# Patient Record
Sex: MALE | Race: BLACK OR AFRICAN AMERICAN | HISPANIC OR LATINO | Employment: FULL TIME | URBAN - METROPOLITAN AREA
[De-identification: names, ages, dates, MRNs, and addresses within clinical notes are randomized per-mention and may not be internally consistent; named-entity substitution may affect disease eponyms.]

---

## 2019-08-23 ENCOUNTER — HOSPITAL ENCOUNTER (EMERGENCY)
Facility: HOSPITAL | Age: 34
Discharge: HOME | End: 2019-08-23
Attending: EMERGENCY MEDICINE | Admitting: EMERGENCY MEDICINE
Payer: COMMERCIAL

## 2019-08-23 VITALS
SYSTOLIC BLOOD PRESSURE: 118 MMHG | RESPIRATION RATE: 18 BRPM | HEART RATE: 79 BPM | OXYGEN SATURATION: 100 % | DIASTOLIC BLOOD PRESSURE: 62 MMHG | WEIGHT: 168.65 LBS | TEMPERATURE: 97.5 F

## 2019-08-23 DIAGNOSIS — R55 SYNCOPE: Primary | ICD-10-CM

## 2019-08-23 LAB
ALBUMIN SERPL BCP-MCNC: 4.4 G/DL (ref 3–5.2)
ALP SERPL-CCNC: 67 U/L (ref 43–122)
ALT SERPL W P-5'-P-CCNC: 15 U/L (ref 9–52)
ANION GAP SERPL CALCULATED.3IONS-SCNC: 8 MMOL/L (ref 5–14)
AST SERPL W P-5'-P-CCNC: 23 U/L (ref 17–59)
ATRIAL RATE: 73 BPM
BASOPHILS # BLD AUTO: 0.1 THOUSANDS/ΜL (ref 0–0.1)
BASOPHILS NFR BLD AUTO: 1 % (ref 0–1)
BILIRUB SERPL-MCNC: 0.3 MG/DL
BUN SERPL-MCNC: 16 MG/DL (ref 5–25)
CALCIUM SERPL-MCNC: 9.3 MG/DL (ref 8.4–10.2)
CHLORIDE SERPL-SCNC: 103 MMOL/L (ref 97–108)
CO2 SERPL-SCNC: 29 MMOL/L (ref 22–30)
CREAT SERPL-MCNC: 0.94 MG/DL (ref 0.7–1.5)
EOSINOPHIL # BLD AUTO: 0.1 THOUSAND/ΜL (ref 0–0.4)
EOSINOPHIL NFR BLD AUTO: 2 % (ref 0–6)
ERYTHROCYTE [DISTWIDTH] IN BLOOD BY AUTOMATED COUNT: 15 %
GFR SERPL CREATININE-BSD FRML MDRD: 124 ML/MIN/1.73SQ M
GLUCOSE SERPL-MCNC: 78 MG/DL (ref 65–140)
GLUCOSE SERPL-MCNC: 87 MG/DL (ref 70–99)
HCT VFR BLD AUTO: 36.8 % (ref 41–53)
HGB BLD-MCNC: 12.2 G/DL (ref 13.5–17.5)
LYMPHOCYTES # BLD AUTO: 2.1 THOUSANDS/ΜL (ref 0.5–4)
LYMPHOCYTES NFR BLD AUTO: 33 % (ref 25–45)
MCH RBC QN AUTO: 27.5 PG (ref 26–34)
MCHC RBC AUTO-ENTMCNC: 33.1 G/DL (ref 31–36)
MCV RBC AUTO: 83 FL (ref 80–100)
MONOCYTES # BLD AUTO: 0.6 THOUSAND/ΜL (ref 0.2–0.9)
MONOCYTES NFR BLD AUTO: 10 % (ref 1–10)
NEUTROPHILS # BLD AUTO: 3.5 THOUSANDS/ΜL (ref 1.8–7.8)
NEUTS SEG NFR BLD AUTO: 54 % (ref 45–65)
P AXIS: 38 DEGREES
PLATELET # BLD AUTO: 461 THOUSANDS/UL (ref 150–450)
PMV BLD AUTO: 7.8 FL (ref 8.9–12.7)
POTASSIUM SERPL-SCNC: 4.8 MMOL/L (ref 3.6–5)
PR INTERVAL: 112 MS
PROT SERPL-MCNC: 8 G/DL (ref 5.9–8.4)
QRS AXIS: 69 DEGREES
QRSD INTERVAL: 80 MS
QT INTERVAL: 394 MS
QTC INTERVAL: 434 MS
RBC # BLD AUTO: 4.43 MILLION/UL (ref 4.5–5.9)
SODIUM SERPL-SCNC: 140 MMOL/L (ref 137–147)
T WAVE AXIS: 30 DEGREES
VENTRICULAR RATE: 73 BPM
WBC # BLD AUTO: 6.5 THOUSAND/UL (ref 4.5–11)

## 2019-08-23 PROCEDURE — 82948 REAGENT STRIP/BLOOD GLUCOSE: CPT

## 2019-08-23 PROCEDURE — 93005 ELECTROCARDIOGRAM TRACING: CPT

## 2019-08-23 PROCEDURE — 99284 EMERGENCY DEPT VISIT MOD MDM: CPT

## 2019-08-23 PROCEDURE — 93010 ELECTROCARDIOGRAM REPORT: CPT | Performed by: INTERNAL MEDICINE

## 2019-08-23 PROCEDURE — 80053 COMPREHEN METABOLIC PANEL: CPT | Performed by: EMERGENCY MEDICINE

## 2019-08-23 PROCEDURE — 36415 COLL VENOUS BLD VENIPUNCTURE: CPT | Performed by: EMERGENCY MEDICINE

## 2019-08-23 PROCEDURE — 99283 EMERGENCY DEPT VISIT LOW MDM: CPT | Performed by: EMERGENCY MEDICINE

## 2019-08-23 PROCEDURE — 96360 HYDRATION IV INFUSION INIT: CPT

## 2019-08-23 PROCEDURE — 85025 COMPLETE CBC W/AUTO DIFF WBC: CPT | Performed by: EMERGENCY MEDICINE

## 2019-08-23 RX ORDER — BUPRENORPHINE HYDROCHLORIDE AND NALOXONE HYDROCHLORIDE DIHYDRATE 8; 2 MG/1; MG/1
1 TABLET SUBLINGUAL 2 TIMES DAILY
COMMUNITY

## 2019-08-23 RX ADMIN — SODIUM CHLORIDE 1000 ML: 0.9 INJECTION, SOLUTION INTRAVENOUS at 14:19

## 2019-08-23 NOTE — ED NOTES
Pt states he was recently put on Suboxone and ran out of meds on Monday          Ana Rosa Mora RN  08/23/19 1136

## 2019-08-23 NOTE — ED PROVIDER NOTES
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 felt a little bit off and then remembers waking up on the floor with 2 coworkers around him  He states he has passed out in the past but has no recollection of what was going on at that time  States his medications these pose a take a related to schizophrenia, bipolar, hypertension  States he recently moved here from [place] this not been taking his medications for about 1 month  He states he does take Suboxone as well too to previous addiction problems  Denies any focal weakness numbness or tingling  Denies any current headache, vision changes, nausea vomiting diarrhea dysuria frequency  States he has not been eating much food recently but has been drinking plenty of fluids  Prior to Admission Medications   Prescriptions Last Dose Informant Patient Reported? Taking? buprenorphine-naloxone (SUBOXONE) 8-2 mg per SL tablet 8/19/2019  Yes Yes   Sig: Place 1 tablet under the tongue 2 (two) times a day      Facility-Administered Medications: None       Past Medical History:   Diagnosis Date    Hypertension        History reviewed  No pertinent surgical history  History reviewed  No pertinent family history  I have reviewed and agree with the history as documented  Social History     Tobacco Use    Smoking status: Current Every Day Smoker     Packs/day: 0 25    Smokeless tobacco: Never Used   Substance Use Topics    Alcohol use: Not Currently    Drug use: Not Currently        Review of Systems   Constitutional: Negative  Negative for chills and fever  HENT: Negative  Negative for rhinorrhea, sore throat, trouble swallowing and voice change  Eyes: Negative  Negative for pain and visual disturbance  Respiratory: Negative  Negative for cough, shortness of breath and wheezing  Cardiovascular: Negative  Negative for chest pain and palpitations  Gastrointestinal: Negative for abdominal pain, diarrhea, nausea and vomiting  Genitourinary: Negative  Negative for dysuria and frequency  Musculoskeletal: Negative  Negative for neck pain and neck stiffness  Skin: Negative  Negative for rash  Neurological: Positive for syncope  Negative for dizziness, speech difficulty, weakness, light-headedness, numbness and headaches  Physical Exam  Physical Exam   Constitutional: He is oriented to person, place, and time  He appears well-developed and well-nourished  No distress  HENT:   Head: Normocephalic and atraumatic  Mouth/Throat: Oropharynx is clear and moist    Eyes: Pupils are equal, round, and reactive to light  Conjunctivae and EOM are normal    Neck: Normal range of motion  Neck supple  No tracheal deviation present  Cardiovascular: Normal rate, regular rhythm and intact distal pulses  Pulmonary/Chest: Effort normal and breath sounds normal  No respiratory distress  He has no wheezes  He has no rales  Abdominal: Soft  Bowel sounds are normal  He exhibits no distension  There is no tenderness  There is no rebound and no guarding  Musculoskeletal: Normal range of motion  He exhibits no tenderness or deformity  Neurological: He is alert and oriented to person, place, and time  Skin: Skin is warm and dry  Capillary refill takes less than 2 seconds  No rash noted  Psychiatric: He has a normal mood and affect  His behavior is normal    Nursing note and vitals reviewed        Vital Signs  ED Triage Vitals [08/23/19 1346]   Temperature Pulse Respirations Blood Pressure SpO2   97 5 °F (36 4 °C) 81 18 137/77 98 %      Temp Source Heart Rate Source Patient Position - Orthostatic VS BP Location FiO2 (%)   Tympanic Monitor Lying Left arm --      Pain Score       No Pain           Vitals: 08/23/19 1346   BP: 137/77   Pulse: 81   Patient Position - Orthostatic VS: Lying         Visual Acuity      ED Medications  Medications   sodium chloride 0 9 % bolus 1,000 mL (1,000 mL Intravenous New Bag 8/23/19 1419)       Diagnostic Studies  Results Reviewed     Procedure Component Value Units Date/Time    Comprehensive metabolic panel [[id number]]  (Normal) Collected:  08/23/19 1419    Lab Status:  Final result Specimen:  Blood from Arm, Left Updated:  08/23/19 1445     Sodium 140 mmol/L      Potassium 4 8 mmol/L      Chloride 103 mmol/L      CO2 29 mmol/L      ANION GAP 8 mmol/L      BUN 16 mg/dL      Creatinine 0 94 mg/dL      Glucose 87 mg/dL      Calcium 9 3 mg/dL      AST 23 U/L      ALT 15 U/L      Alkaline Phosphatase 67 U/L      Total Protein 8 0 g/dL      Albumin 4 4 g/dL      Total Bilirubin 0 30 mg/dL      eGFR 124 ml/min/1 73sq m     Narrative:       National Kidney Disease Foundation guidelines for Chronic Kidney Disease (CKD):     Stage 1 with normal or high GFR (GFR > 90 mL/min/1 73 square meters)    Stage 2 Mild CKD (GFR = 60-89 mL/min/1 73 square meters)    Stage 3A Moderate CKD (GFR = 45-59 mL/min/1 73 square meters)    Stage 3B Moderate CKD (GFR = 30-44 mL/min/1 73 square meters)    Stage 4 Severe CKD (GFR = 15-29 mL/min/1 73 square meters)    Stage 5 End Stage CKD (GFR <15 mL/min/1 73 square meters)  Note: GFR calculation is accurate only with a steady state creatinine    CBC and differential [[id number]]  (Abnormal) Collected:  08/23/19 1419    Lab Status:  Final result Specimen:  Blood from Arm, Left Updated:  08/23/19 1439     WBC 6 50 Thousand/uL      RBC 4 43 Million/uL      Hemoglobin 12 2 g/dL      Hematocrit 36 8 %      MCV 83 fL      MCH 27 5 pg      MCHC 33 1 g/dL      RDW 15 0 %      MPV 7 8 fL      Platelets 778 Thousands/uL      Neutrophils Relative 54 %      Lymphocytes Relative 33 %      Monocytes Relative 10 %      Eosinophils Relative 2 %      Basophils Relative 1 % Neutrophils Absolute 3 50 Thousands/µL      Lymphocytes Absolute 2 10 Thousands/µL      Monocytes Absolute 0 60 Thousand/µL      Eosinophils Absolute 0 10 Thousand/µL      Basophils Absolute 0 10 Thousands/µL     UA w Reflex to Microscopic [[id number]]     Lab Status:  No result Specimen:  Urine     Fingerstick Glucose (POCT) [[id number]]  (Normal) Collected:  08/23/19 1353    Lab Status:  Final result Updated:  08/23/19 1353     POC Glucose 78 mg/dl                  No orders to display              Procedures  Procedures       ED Course  ED Course as of Aug 23 1505   Fri Aug 23, 2019   1408 Procedure Note: EKG  Date/Time: 08/23/19 2:08 PM   Performed by: Darleen House  Authorized by: Darleen House  ECG interpreted by me, the ED Provider: yes   The EKG demonstrates:  Rate 73  Rhythm sinus rhythm  QTc 434  No ST elevations/depressions                                      MDM  Number of Diagnoses or Management Options  Syncope:   Diagnosis management comments: Patient has declined to provide a urine sample  Received IV fluids, blood work is okay  EKG unremarkable  Accu-Chek was okay as well  He is now ambulating without difficulty  Continues to have a benign neurologic examination  He was advised needs to follow up and establish himself with a primary care physician  I have reviewed all the patient's pertinent blood work, imaging results and other testing and their evaluation here in the emergency room  They verbalized understanding of the testing today and have no further questions or concerns regarding the care here in the emergency room  He will follow up with her primary care physician as well as with any specialist in their discharge instructions  Strict return precautions were discussed         Amount and/or Complexity of Data Reviewed  Clinical lab tests: ordered and reviewed  Independent visualization of images, tracings, or specimens: yes    Patient Progress  Patient progress: stable      Disposition  Final diagnoses:   Syncope     Time reflects when diagnosis was documented in both MDM as applicable and the Disposition within this note     Time User Action Codes Description Comment    8/23/2019  2:56 PM Ebony Vega Add [R55] Syncope       ED Disposition     ED Disposition Condition Date/Time Comment    Discharge Stable Fri Aug 23, 2019  2:56 PM Jerome Grady discharge to home/self care  Follow-up Information     Follow up With Specialties Details Why Contact Info Additional [id number] In 1 week  [address], [address] [facility] [id number]  [address], [address], [address] [place], [place], [address]          Patient's Medications   Discharge Prescriptions    No medications on file     No discharge procedures on file      ED Provider  Electronically Signed by           Deanna Christian DO  08/23/19 9814

## 2024-08-23 ENCOUNTER — HOSPITAL ENCOUNTER (EMERGENCY)
Facility: HOSPITAL | Age: 39
Discharge: HOME | End: 2024-08-23
Attending: EMERGENCY MEDICINE
Payer: OTHER GOVERNMENT

## 2024-08-23 ENCOUNTER — APPOINTMENT (EMERGENCY)
Dept: CT IMAGING | Facility: HOSPITAL | Age: 39
End: 2024-08-23
Payer: OTHER GOVERNMENT

## 2024-08-23 VITALS
DIASTOLIC BLOOD PRESSURE: 84 MMHG | OXYGEN SATURATION: 99 % | WEIGHT: 154.76 LBS | HEART RATE: 96 BPM | SYSTOLIC BLOOD PRESSURE: 126 MMHG | TEMPERATURE: 98.3 F | RESPIRATION RATE: 42 BRPM

## 2024-08-23 DIAGNOSIS — T50.901A ACCIDENTAL OVERDOSE, INITIAL ENCOUNTER: Primary | ICD-10-CM

## 2024-08-23 DIAGNOSIS — R29.810 FACIAL DROOP: ICD-10-CM

## 2024-08-23 DIAGNOSIS — M62.81 LEFT-SIDED MUSCLE WEAKNESS: ICD-10-CM

## 2024-08-23 PROBLEM — R29.90 STROKE-LIKE SYMPTOMS: Status: ACTIVE | Noted: 2024-08-23

## 2024-08-23 LAB
AMPHETAMINES SERPL QL SCN: NEGATIVE
ANION GAP SERPL CALCULATED.3IONS-SCNC: 6 MMOL/L (ref 4–13)
APAP SERPL-MCNC: <2 UG/ML (ref 10–20)
APTT PPP: 32 SECONDS (ref 23–34)
BARBITURATES UR QL: NEGATIVE
BENZODIAZ UR QL: NEGATIVE
BUN SERPL-MCNC: 13 MG/DL (ref 5–25)
CALCIUM SERPL-MCNC: 9.5 MG/DL (ref 8.4–10.2)
CARDIAC TROPONIN I PNL SERPL HS: <2 NG/L
CHLORIDE SERPL-SCNC: 101 MMOL/L (ref 96–108)
CO2 SERPL-SCNC: 31 MMOL/L (ref 21–32)
COCAINE UR QL: NEGATIVE
CREAT SERPL-MCNC: 0.87 MG/DL (ref 0.6–1.3)
ERYTHROCYTE [DISTWIDTH] IN BLOOD BY AUTOMATED COUNT: 13.2 % (ref 11.6–15.1)
ETHANOL SERPL-MCNC: <10 MG/DL
FENTANYL UR QL SCN: NEGATIVE
FLUAV RNA RESP QL NAA+PROBE: NEGATIVE
FLUBV RNA RESP QL NAA+PROBE: NEGATIVE
GFR SERPL CREATININE-BSD FRML MDRD: 111 ML/MIN/1.73SQ M
GLUCOSE SERPL-MCNC: 91 MG/DL (ref 65–140)
GLUCOSE SERPL-MCNC: 91 MG/DL (ref 65–140)
HCT VFR BLD AUTO: 38.7 % (ref 36.5–49.3)
HGB BLD-MCNC: 12.7 G/DL (ref 12–17)
HYDROCODONE UR QL SCN: NEGATIVE
INR PPP: 0.98 (ref 0.85–1.19)
MCH RBC QN AUTO: 28.3 PG (ref 26.8–34.3)
MCHC RBC AUTO-ENTMCNC: 32.8 G/DL (ref 31.4–37.4)
MCV RBC AUTO: 86 FL (ref 82–98)
METHADONE UR QL: NEGATIVE
OPIATES UR QL SCN: NEGATIVE
OXYCODONE+OXYMORPHONE UR QL SCN: NEGATIVE
PCP UR QL: NEGATIVE
PLATELET # BLD AUTO: 330 THOUSANDS/UL (ref 149–390)
PMV BLD AUTO: 9.1 FL (ref 8.9–12.7)
POTASSIUM SERPL-SCNC: 3.9 MMOL/L (ref 3.5–5.3)
PROTHROMBIN TIME: 13.2 SECONDS (ref 12.3–15)
RBC # BLD AUTO: 4.48 MILLION/UL (ref 3.88–5.62)
RSV RNA RESP QL NAA+PROBE: NEGATIVE
SALICYLATES SERPL-MCNC: <5 MG/DL (ref 3–20)
SARS-COV-2 RNA RESP QL NAA+PROBE: NEGATIVE
SODIUM SERPL-SCNC: 138 MMOL/L (ref 135–147)
THC UR QL: NEGATIVE
WBC # BLD AUTO: 5.51 THOUSAND/UL (ref 4.31–10.16)

## 2024-08-23 PROCEDURE — 80179 DRUG ASSAY SALICYLATE: CPT

## 2024-08-23 PROCEDURE — 99285 EMERGENCY DEPT VISIT HI MDM: CPT

## 2024-08-23 PROCEDURE — 99214 OFFICE O/P EST MOD 30 MIN: CPT | Performed by: PHYSICIAN ASSISTANT

## 2024-08-23 PROCEDURE — 0241U HB NFCT DS VIR RESP RNA 4 TRGT: CPT

## 2024-08-23 PROCEDURE — 82948 REAGENT STRIP/BLOOD GLUCOSE: CPT

## 2024-08-23 PROCEDURE — 70496 CT ANGIOGRAPHY HEAD: CPT

## 2024-08-23 PROCEDURE — 84484 ASSAY OF TROPONIN QUANT: CPT

## 2024-08-23 PROCEDURE — 80048 BASIC METABOLIC PNL TOTAL CA: CPT

## 2024-08-23 PROCEDURE — 85027 COMPLETE CBC AUTOMATED: CPT

## 2024-08-23 PROCEDURE — 70498 CT ANGIOGRAPHY NECK: CPT

## 2024-08-23 PROCEDURE — 85730 THROMBOPLASTIN TIME PARTIAL: CPT

## 2024-08-23 PROCEDURE — 80307 DRUG TEST PRSMV CHEM ANLYZR: CPT

## 2024-08-23 PROCEDURE — 85610 PROTHROMBIN TIME: CPT

## 2024-08-23 PROCEDURE — 93005 ELECTROCARDIOGRAM TRACING: CPT

## 2024-08-23 PROCEDURE — 80143 DRUG ASSAY ACETAMINOPHEN: CPT

## 2024-08-23 PROCEDURE — 82077 ASSAY SPEC XCP UR&BREATH IA: CPT

## 2024-08-23 PROCEDURE — 36415 COLL VENOUS BLD VENIPUNCTURE: CPT

## 2024-08-23 RX ORDER — NALOXONE HYDROCHLORIDE 1 MG/ML
1 INJECTION PARENTERAL ONCE
Status: COMPLETED | OUTPATIENT
Start: 2024-08-23 | End: 2024-08-23

## 2024-08-23 RX ADMIN — IOHEXOL 85 ML: 350 INJECTION, SOLUTION INTRAVENOUS at 12:53

## 2024-08-23 NOTE — ED NOTES
"Lengthy conversation with patient. Primary RN, unit manager, provider, and CO officer at bedside. Pt unhappy with care, tossing and turning in bed, stating \"I only wanted my abd checked\" and \"yall aren't doing anything\". Patient refusing medication and IV fluids. Requesting to leave AMA. Multiple attempts to explain patients presentation on arrival and plan of care was unsuccessful as patient continues to talk over staff and not verbalize understanding. Pt continues to toss and turn in bed while yelling at staff. AMA risks explained by provider, patient verbalized understanding and able to sign AMA form. All neuro deficits resolved at this time.        Ashley Batres RN  08/23/24 1400    "

## 2024-08-23 NOTE — CONSULTS
Consultation - Stroke   Jerome Grady 38 y.o. male MRN: [id number]  Unit/Bed#: ED-04 Encounter: [id number]      Assessment & Plan     Stroke-like symptoms  Assessment & Plan  38 year old male with hypertension, presented from the residential, after being found unresponsive with stroke like symptoms. Patient was last seen normal at breakfast. Shortly after, he was found unresponsive and received Narcan, with improvement in his mental status. Staff thought he had some left sided weakness and a left facial droop as well, so a stroke alert was activated in the ED.     On exam, patient reports diffuse pain and weakness. He states he cannot lift any of his extremities. With passive elevation of his right arm, he can maintain antigravity. With passive elevation of his legs and his left arm, he drifts slightly but can then maintain antigravity. At rest, his face is symmetric. With activation, he has a volitional left sided facial droop. No sensory deficits, dysarthria, or aphasia evident. NIHSS 4.     CT/CTA were unremarkable.   He was not a TNK candidate as his his last known normal was outside the window.     At this time, lower suspicion for acute stroke given non-organic findings on exam as described above, however cannot entirely exclude. Differential also includes functional neurologic disorder vs malingering.  Unclear etiology of reported diffuse pain.   Question cause of syncope to be drug induced, given response to Narcan.     Plan:  - Can obtain MRI brain wo contrast to definitively rule out stroke. Can defer remainder of stroke pathway at this time.   - Recommend drug screen  - Will defer investigation of reported pain to primary team        Thrombolytic Decision: Patient not a candidate. Unclear time of onset outside appropriate time window.      Recommendations for outpatient neurological follow up have yet to be determined.      History of Present Illness     Reason for Consult / Principal Problem:  Left-sided weakness, stroke  Patient last known well: 7:30-8am.  Stroke alert called: 1241  Neurology time of arrival: Neurologist responded by phone immediately. Neurology eventually present at bedside.     HPI: Jerome Grady is a 38 y.o.  male with HTN who presents after being found down by nursing home guards. Per staff, patient went to breakfast sometime between 7:30-8am. He reports feeling at  his baseline at that time. He recalls drinking coffee. He later was found unresponsive by guards. They administered Narcan and he became more responsive. They felt that his left side was weak and he had a left facial droop. When asking the patient why he came to the hospital, he does admit that he blacked out earlier, but does not state why he thinks he blacked out. He also states that he is having abdominal pain.     On arrival, nursing activated a stroke alert. Blood pressure on arrival 164/100. Other vitals stable. On exam, patient is awake, alert, and oriented. No dysarthria present. He initially states he is unable to lift his arms or legs. He has a controlled drift with all of his extremities. He has no sensory deficits. He has a volitional left facial droop, that is symmetric at rest and initially upon smiling, but then demonstrates a forced droop to the left. Eyebrows symmetric. No aphasia present. No ataxia. NIHSS 4 for drift without hitting bed in the left arm, right leg, and left leg. Also 1 point for facial asymmetry though see above for description.     Patient was not a TNK candidate as the last known well time was outside of the window.        Consult to Neurology  Consult performed by: JORGE Smith  Consult ordered by: Boris Irizarry PA-C          Review of Systems   Unable to perform ROS: Acuity of condition       Historical Information   Past Medical History:   Diagnosis Date    Hypertension      No past surgical history on file.  Social History   Social History     Substance and Sexual  Activity   Alcohol Use Not Currently     Social History     Substance and Sexual Activity   Drug Use Not Currently     E-Cigarette/Vaping     E-Cigarette/Vaping Substances     Social History     Tobacco Use   Smoking Status Every Day    Current packs/day: 0.25    Types: Cigarettes   Smokeless Tobacco Never     Family History: No family history on file.    Review of previous medical records was completed.     Meds/Allergies   all current active meds have been reviewed, current meds:   No current facility-administered medications for this encounter.   , and PTA meds:   Prior to Admission Medications   Prescriptions Last Dose Informant Patient Reported? Taking?   buprenorphine-naloxone (SUBOXONE) 8-2 mg per SL tablet   Yes No   Sig: Place 1 tablet under the tongue 2 (two) times a day      Facility-Administered Medications: None       No Known Allergies    Objective   Vitals:Blood pressure 126/84, pulse 96, temperature 98.3 °F (36.8 °C), temperature source Oral, resp. rate (!) 42, weight 70.2 kg (154 lb 12.2 oz), SpO2 99%.,There is no height or weight on file to calculate BMI.  No intake or output data in the 24 hours ending 08/23/24 1408    Invasive Devices:   Invasive Devices       None                   Physical Exam  Vitals and nursing note reviewed.   Constitutional:       General: He is not in acute distress.     Appearance: He is not ill-appearing, toxic-appearing or diaphoretic.   HENT:      Head: Normocephalic and atraumatic.      Nose: Nose normal.      Mouth/Throat:      Mouth: Mucous membranes are moist.      Pharynx: Oropharynx is clear.   Eyes:      General: No scleral icterus.        Right eye: No discharge.         Left eye: No discharge.      Extraocular Movements: Extraocular movements intact.   Cardiovascular:      Rate and Rhythm: Normal rate.   Pulmonary:      Effort: Pulmonary effort is normal. No respiratory distress.   Musculoskeletal:         General: Tenderness (L knee) present.      Cervical  back: Normal range of motion.      Right lower leg: No edema.      Left lower leg: No edema.   Skin:     General: Skin is warm and dry.   Neurological:      Mental Status: He is alert.      Coordination: Finger-nose-finger test: Difficult to assess- patient does not elevate arms in order to touch his nose..      Comments: Detailed below   Psychiatric:         Speech: Speech normal.       Neurologic Exam     Mental Status   Speech: speech is normal   Level of consciousness: alert  No dysarthria or aphasia     Cranial Nerves     CN II   Visual fields full to confrontation.     CN III, IV, VI   Downgaze: normal  Conjugate gaze: present    CN V   Facial sensation intact.     CN VII   Left facial weakness: Symmetric at rest, symmetric initially upon smiling but then forces left lower facial droop.    CN VIII   Hearing: intact (to voice)    Motor Exam With passive elevation of R arm, patient can maintain antigravity. With passive elevation of L arm, he drifts slightly but then maintains antigravity for 10 seconds. He can lift both L leg and R leg with some drift.      Sensory Exam   Sensation intact to light touch. Negative extinction testing.      Gait, Coordination, and Reflexes     Gait  Gait: (Deferred)    Coordination   Finger-nose-finger test: Difficult to assess- patient does not elevate arms in order to touch his nose..      NIHSS:  1a.Level of Consciousness: 0 = Alert   1b. LOC Questions: 0 = Answers both correctly   1c. LOC Commands: 0 = Obeys both correctly   2. Best Gaze: 0 = Normal   3. Visual: 0 = No visual field loss   4. Facial Palsy: 1=Minor paralysis (flattened nasolabial fold, asymmetric on smiling)   5a. Motor Right Arm: 0=No drift, limb holds 90 (or 45) degrees for full 10 seconds   5b. Motor Left Arm: 1=Drift, limb holds 90 (or 45) degrees but drifts down before full 10 seconds: does not hit bed   6a. Motor Right Le=Drift, limb holds 90 (or 45) degrees but drifts down before full 10 seconds: does  not hit bed   6b. Motor Left Le=Drift, limb holds 90 (or 45) degrees but drifts down before full 10 seconds: does not hit bed   7. Limb Ataxia:  0=Absent   8. Sensory: 0=Normal; no sensory loss   9. Best Language:  0=No aphasia, normal   10. Dysarthria: 0=Normal articulation   11. Extinction and Inattention (formerly Neglect): 0=No abnormality   Total Score: 4     Time NIHSS was completed: 1505    Modified Bay[facility] Score: 0    Lab Results: I have personally reviewed pertinent reports.      Imaging Studies: I have personally reviewed pertinent reports.   and I have personally reviewed pertinent films in PACS (CT/CTA, attending discussed with radiology as well)  EKG, Pathology, and Other Studies: I have personally reviewed pertinent reports.   and I have personally reviewed pertinent films in PACS  VTE Prophylaxis: Currently in ED    Code Status: No Order      Counseling / Coordination of Care  Total Critical Care time spent 28 minutes excluding procedures, teaching and family updates.

## 2024-08-23 NOTE — ED NOTES
Patient left unit in wheelchair, in custody with 2 CO's and hospital . Pt thanking this RN for care at departure.      Ashley Batres RN  08/23/24 7996

## 2024-08-23 NOTE — ASSESSMENT & PLAN NOTE
38 year old male with hypertension[facility], after being found unresponsive with stroke like symptoms. Patient was last seen normal at breakfast. Shortly after, he was found unresponsive and received Narcan, with improvement in his mental status. Staff thought he had some left sided weakness and a left facial droop as well, so a stroke alert was activated in the ED.     On exam, patient reports diffuse pain and weakness. He states he cannot lift any of his extremities. With passive elevation of his right arm, he can maintain antigravity. With passive elevation of his legs and his left arm, he drifts slightly but can then maintain antigravity. At rest, his face is symmetric. With activation, he has a volitional left sided facial droop. No sensory deficits, dysarthria, or aphasia evident. NIHSS 4.     CT/CTA were unremarkable.   He was not a TNK candidate as his his last known normal was outside the window.     At this time, lower suspicion for acute stroke given non-organic findings on exam as described above, however cannot entirely exclude. Differential also includes functional neurologic disorder vs malingering.  Unclear etiology of reported diffuse pain.   Question cause of syncope to be drug induced, given response to Narcan.     Plan:  - Can obtain MRI brain wo contrast to definitively rule out stroke. Can defer remainder of stroke pathway at this time.   - Recommend drug screen  - Will defer investigation of reported pain to primary team

## 2024-08-23 NOTE — ED PROVIDER NOTES
"History  Chief Complaint   Patient presents with    Overdose - Accidental     Pt arrives from [facility] after being unresponsive after drinking coffee this morning. Given 2 mg narcan IV, now awake but lethargic. C/o R eye, chest, abd \"inside\" pain. Left sided weakness and reactive unequal pupils per [facility].  Left sided mouth droop. Not patients baseline per CO.     38-year-old male with past medical history of hypertension, on Suboxone at [facility] presents today for being found unresponsive in his cell.  Patient reports that it all started after he drank his coffee this morning per [facility] he was last seen normal at breakfast which was between 730 and 8 AM.  Patient was found unresponsive in his cell around 11:30 AM.  [facility] reports they gave 2 rounds of Narcan and patient was awake.  However he developed a left-sided facial droop.  Was sent in for further evaluation.    On arrival patient is oriented but slow to respond.  Is able to follow all commands.  Seems to have a global weakness.  Visualize left sided mouth droop.  Also seems to have a left arm weaknes with  strength.  A stroke alert was called by nursing staff.  After patient returned from CT scan, neurology did evaluate him.  After neurology left the room I went in to reevaluate the patient.  Patient then started to complain of his chest hurting.  During this patient was hooked up to the cardiac monitor and there were no acute arrhythmias or ST elevation seen.  Pulse remained normal.  O2 saturations remained normal.  I explained to the patient that we were in the process of working him up and I did offer him pain medications.  However patient then darted to raise his voice and demanded to go back to the custodial.  He refused all medication and further workup at this time.  He continuously was stating that we were not focusing on his stomach which was his main concern, multiple people tried explaining to him that we were offering him pain medicine and a further workup but " patient continually denied.        Prior to Admission Medications   Prescriptions Last Dose Informant Patient Reported? Taking?   buprenorphine-naloxone (SUBOXONE) 8-2 mg per SL tablet   Yes No   Sig: Place 1 tablet under the tongue 2 (two) times a day      Facility-Administered Medications: None       Past Medical History:   Diagnosis Date    Hypertension        No past surgical history on file.    No family history on file.  I have reviewed and agree with the history as documented.    E-Cigarette/Vaping     E-Cigarette/Vaping Substances     Social History     Tobacco Use    Smoking status: Every Day     Current packs/day: 0.25     Types: Cigarettes    Smokeless tobacco: Never   Substance Use Topics    Alcohol use: Not Currently    Drug use: Not Currently       Review of Systems   Constitutional:  Negative for chills and fever.   Cardiovascular:  Positive for chest pain.   Gastrointestinal:  Positive for abdominal pain. Negative for nausea and vomiting.   All other systems reviewed and are negative.      Physical Exam  Physical Exam  Vitals and nursing note reviewed.   Constitutional:       General: He is not in acute distress.     Appearance: Normal appearance. He is well-developed. He is not ill-appearing.   HENT:      Head: Normocephalic and atraumatic.   Eyes:      Conjunctiva/sclera: Conjunctivae normal.   Cardiovascular:      Rate and Rhythm: Normal rate and regular rhythm.      Heart sounds: No murmur heard.  Pulmonary:      Effort: Pulmonary effort is normal. No respiratory distress.      Breath sounds: Normal breath sounds.   Abdominal:      Palpations: Abdomen is soft.      Tenderness: There is no abdominal tenderness.   Musculoskeletal:         General: No swelling.      Cervical back: Normal range of motion and neck supple.   Skin:     General: Skin is warm and dry.      Capillary Refill: Capillary refill takes less than 2 seconds.   Neurological:      Mental Status: He is alert and oriented to person,  place, and time.      GCS: GCS eye subscore is 4. GCS verbal subscore is 5. GCS motor subscore is 6.      Cranial Nerves: Cranial nerve deficit, dysarthria and facial asymmetry present.      Sensory: Sensory deficit present.      Motor: Weakness present.   Psychiatric:         Mood and Affect: Mood normal.         Behavior: Behavior normal.         Vital Signs  ED Triage Vitals   Temperature Pulse Respirations Blood Pressure SpO2   08/23/24 1245 08/23/24 1245 08/23/24 1245 08/23/24 1245 08/23/24 1315   98.3 °F (36.8 °C) 76 16 164/100 99 %      Temp Source Heart Rate Source Patient Position - Orthostatic VS BP Location FiO2 (%)   08/23/24 1245 08/23/24 1245 08/23/24 1245 08/23/24 1315 --   Oral Monitor Lying Right arm       Pain Score       --                  Vitals:    08/23/24 1315 08/23/24 1320 08/23/24 1325 08/23/24 1330   BP: 126/84      Pulse: 101 99 105 96   Patient Position - Orthostatic VS: Lying            Visual Acuity  Visual Acuity      Flowsheet Row Most Recent Value   L Pupil Size (mm) 3   R Pupil Size (mm) 3            ED Medications  Medications   naloxone (FOR EMS ONLY) (NARCAN) 2 MG/2ML injection 2 mg (0 mg Does not apply Given to EMS 8/23/24 1238)   iohexol (OMNIPAQUE) 350 MG/ML injection (SINGLE-DOSE) 85 mL (85 mL Intravenous Given 8/23/24 1253)       Diagnostic Studies  Results Reviewed       Procedure Component Value Units Date/Time    Rapid drug screen, urine [[id number]]  (Normal) Collected: 08/23/24 1314    Lab Status: Final result Specimen: Urine, Clean Catch Updated: 08/23/24 1344     Amph/Meth UR Negative     Barbiturate Ur Negative     Benzodiazepine Urine Negative     Cocaine Urine Negative     Methadone Urine Negative     Opiate Urine Negative     PCP Ur Negative     THC Urine Negative     Oxycodone Urine Negative     Fentanyl Urine Negative     HYDROCODONE URINE Negative    Narrative:      FOR MEDICAL PURPOSES ONLY.   IF CONFIRMATION NEEDED PLEASE CONTACT THE LAB WITHIN 5  DAYS.    Drug Screen Cutoff Levels:  AMPHETAMINE/METHAMPHETAMINES  1000 ng/mL  BARBITURATES     200 ng/mL  BENZODIAZEPINES     200 ng/mL  COCAINE      300 ng/mL  METHADONE      300 ng/mL  OPIATES      300 ng/mL  PHENCYCLIDINE     25 ng/mL  THC       50 ng/mL  OXYCODONE      100 ng/mL  FENTANYL      5 ng/mL  HYDROCODONE     300 ng/mL    Ethanol [[id number]]  (Normal) Collected: 08/23/24 1314    Lab Status: Final result Specimen: Blood from Arm, Right Updated: 08/23/24 1334     Ethanol Lvl <10 mg/dL     FLU/RSV/COVID - if FLU/RSV clinically relevant [[id number]]  (Normal) Collected: 08/23/24 1241    Lab Status: Final result Specimen: Nares from Nose Updated: 08/23/24 1328     SARS-CoV-2 Negative     INFLUENZA A PCR Negative     INFLUENZA B PCR Negative     RSV PCR Negative    Narrative:      This test has been performed using the CoV-2/Flu/RSV plus assay on the [employer] platform. This test has been validated by the  and verified by the performing laboratory.     This test is designed to amplify and detect the following: nucleocapsid (N), envelope (E), and RNA-dependent RNA polymerase (RdRP) genes of the SARS-CoV-2 genome; matrix (M), basic polymerase (PB2), and acidic protein (PA) segments of the influenza A genome; matrix (M) and non-structural protein (NS) segments of the influenza B genome, and the nucleocapsid genes of RSV A and RSV B.     Positive results are indicative of the presence of Flu A, Flu B, RSV, and/or SARS-CoV-2 RNA. Positive results for SARS-CoV-2 or suspected novel influenza should be reported to state, local, or federal health departments according to local reporting requirements.      All results should be assessed in conjunction with clinical presentation and other laboratory markers for clinical management.     FOR PEDIATRIC PATIENTS - copy/paste COVID Guidelines URL to browser: [email or web address]       Salicylate level  [[id number]] Collected: 08/23/24 1314    Lab Status: In process Specimen: Blood from Arm, Right Updated: 08/23/24 1317    Acetaminophen level-If concentration is detectable, please discuss with medical  on call. [[id number]] Collected: 08/23/24 1314    Lab Status: In process Specimen: Blood from Arm, Right Updated: 08/23/24 1317    HS Troponin 0hr (reflex protocol) [[id number]]  (Normal) Collected: 08/23/24 1241    Lab Status: Final result Specimen: Blood from Arm, Right Updated: 08/23/24 1311     hs TnI 0hr <2 ng/L     HS Troponin I 2hr [[id number]]     Lab Status: No result Specimen: Blood     Basic metabolic panel [[id number]] Collected: 08/23/24 1241    Lab Status: Final result Specimen: Blood from Arm, Right Updated: 08/23/24 1306     Sodium 138 mmol/L      Potassium 3.9 mmol/L      Chloride 101 mmol/L      CO2 31 mmol/L      ANION GAP 6 mmol/L      BUN 13 mg/dL      Creatinine 0.87 mg/dL      Glucose 91 mg/dL      Calcium 9.5 mg/dL      eGFR 111 ml/min/1.73sq m     Narrative:      National Kid[employer] Foundation guidelines for Chronic Kidney Disease (CKD):     Stage 1 with normal or high GFR (GFR > 90 mL/min/1.73 square meters)    Stage 2 Mild CKD (GFR = 60-89 mL/min/1.73 square meters)    Stage 3A Moderate CKD (GFR = 45-59 mL/min/1.73 square meters)    Stage 3B Moderate CKD (GFR = 30-44 mL/min/1.73 square meters)    Stage 4 Severe CKD (GFR = 15-29 mL/min/1.73 square meters)    Stage 5 End Stage CKD (GFR <15 mL/min/1.73 square meters)  Note: GFR calculation is accurate only with a steady state creatinine    Protime-INR [[id number]]  (Normal) Collected: 08/23/24 1241    Lab Status: Final result Specimen: Blood from Arm, Right Updated: 08/23/24 1300     Protime 13.2 seconds      INR 0.98    Narrative:      INR Therapeutic Range    Indication                                             INR Range      Atrial Fibrillation                                               2.0-3.0  Hypercoagulable State                                     2.0.2.3  Left Ventricular Asist Device                            2.0-3.0  Mechanical Heart Valve                                  -    Aortic(with afib, MI, embolism, HF, LA enlargement,    and/or coagulopathy)                                     2.0-3.0 (2.5-3.5)     Mitral                                                             2.5-3.5  Prosthetic/Bioprosthetic Heart Valve               2.0-3.0  Venous thromboembolism (VTE: VT, PE        2.0-3.0    APTT [[id number]]  (Normal) Collected: 08/23/24 1241    Lab Status: Final result Specimen: Blood from Arm, Right Updated: 08/23/24 1300     PTT 32 seconds     CBC and Platelet [[id number]]  (Normal) Collected: 08/23/24 1241    Lab Status: Final result Specimen: Blood from Arm, Right Updated: 08/23/24 1248     WBC 5.51 Thousand/uL      RBC 4.48 Million/uL      Hemoglobin 12.7 g/dL      Hematocrit 38.7 %      MCV 86 fL      MCH 28.3 pg      MCHC 32.8 g/dL      RDW 13.2 %      Platelets 330 Thousands/uL      MPV 9.1 fL     Fingerstick Glucose (POCT) [[id number]]  (Normal) Collected: 08/23/24 1245    Lab Status: Final result Specimen: Blood Updated: 08/23/24 1245     POC Glucose 91 mg/dl                    CTA stroke alert (head/neck)   Final Result by Kait Andres MD (08/23 1310)      No large vessel occlusion, high-grade stenosis, or intracranial aneurysm identified on CT angiogram of the head.      No hemodynamically significant stenosis or dissection identified on CT angiogram of the neck.      Indeterminate enlarged right hilar lymph node measuring up to 2.3 cm in length dimensions. Recommend further evaluation with CT of the chest with contrast on a nonemergent basis.      Findings were directly discussed with Dr. Willi Mercedes At 1:02 p.m. on 8/23/2024.      Workstation performed: [id number]         CT stroke alert brain   Final Result by Kait Andres MD (08/23 1306)      No acute intracranial abnormality.      Old right lamina papyracea  fracture.      Findings were directly discussed with Dr Willi Mercedes at approximately 12:58 p.m. on 8/23/2024..      Workstation performed: [id number]                    Procedures  ECG 12 Lead Documentation Only    Date/Time: 8/23/2024 1:06 PM    Performed by: Boris Irizarry PA-C  Authorized by: Boris Irizarry PA-C    Indications / Diagnosis:  Overdose, stroke like symptoms  ECG reviewed by me, the ED Provider: yes    Patient location:  ED  Previous ECG:     Previous ECG:  Compared to current  Interpretation:     Interpretation: normal    Rate:     ECG rate:  73    ECG rate assessment: normal    Rhythm:     Rhythm: sinus rhythm    Ectopy:     Ectopy: none    QRS:     QRS axis:  Normal    QRS intervals:  Normal  Conduction:     Conduction: normal    ST segments:     ST segments:  Normal  T waves:     T waves: normal             ED Course  ED Course as of 08/23/24 1351   Fri Aug 23, 2024   1323 The patient insists on leaving Against Medical Advice, despite my recommendation to remain for ongoing treatment.    1: Capacity: I have determined that the patient has capacity to make the decision to leave against medical advice based on the following:    A. Ability to express a choice: The patient is able to express his or her choice and communicate that choice.   B. Ability to understand relevant information: The patient is able to verbalize their diagnosis, understand information about the purpose of treatment, remember the information, and show that he or she can be part of the decision-making process.    C. Ability to appreciate the significance of the information and its consequences: The patient understands the consequences of treatment refusal and the risks and benefits of accepting or refusing treatment.    D. Ability to manipulate information: The patient is able to engage in reasoning as it applies to making treatment decisions   2: Psychiatric Consultation: There is not an indication to call  psychiatry consultation to determine capacity    3. Alternative Treatment: I have discussed the recommended course of treatment and available alternatives.  4. Risks: I have discussed the specific risks of that patient refusing treatment   5. Follow-up Care: I have discussed the follow-up care and advised to see patient's PCP immediately or return here for worsening.   6. ED Option: I have emphasized that the patient has the option to return to the ED. Reviewed that we can have continuation of the workup at any time and that we are always open.                       Stroke Assessment       Row Name 08/23/24 1246             NIH Stroke Scale    Interval Baseline      Level of Consciousness (1a.) 0      LOC Questions (1b.) 0      LOC Commands (1c.) 0      Best Gaze (2.) 0      Visual (3.) 0      Facial Palsy (4.) 2      Motor Arm, Left (5a.) 0      Motor Arm, Right (5b.) 0      Motor Leg, Left (6a.) 0      Motor Leg, Right (6b.) 0      Limb Ataxia (7.) 0      Sensory (8.) 1      Best Language (9.) 0      Dysarthria (10.) 1      Extinction and Inattention (11.) (Formerly Neglect) 0      Total 4                                              Medical Decision Making  Please see HPI.  Patient signed out AGAINST MEDICAL ADVICE.  Multiple people offered him pain medication and further workup however patient declined all of this.  He expressed understanding of risks of going back to [facility] without a complete workup.    Amount and/or Complexity of Data Reviewed  Labs: ordered.  Radiology: ordered.    Risk  Prescription drug management.                 Disposition  Final diagnoses:   Left-sided muscle weakness   Accidental overdose, initial encounter   Facial droop     Time reflects when diagnosis was documented in both MDM as applicable and the Disposition within this note       Time User Action Codes Description Comment    8/23/2024 12:39 PM Ashley Batres Add [M62.81] Left-sided muscle weakness     8/23/2024  1:48 PM Edie  Boris Add [T50.901A] Accidental overdose, initial encounter     8/23/2024  1:48 PM Boris Irizarry Modify [M62.81] Left-sided muscle weakness     8/23/2024  1:48 PM Boris Irizarry Modify [T50.901A] Accidental overdose, initial encounter     8/23/2024  1:48 PM Boris Irizarry Add [R29.810] Facial droop           ED Disposition       ED Disposition   AMA    Condition   --    Date/Time   Fri Aug 23, 2024  1:24 PM    Comment   Date: 8/23/2024  Patient: Jerome Grady  Admitted: 8/23/2024 12:32 PM  Attending Provider: Clyde Machuca*    Jerome Grady or his authorized caregiver has made the decision for the patient to leave the emergency department  against the advice of the emergency department staff. He or his authorized caregiver has been informed and understands the inherent risks, including death, worsening of condition, need for more narcan .  He or his authorized caregiver has decided to  accept the responsibility for this decision. Jerome Grady and all necessary parties have been advised that he may return for further evaluation or treatment. His condition at time of discharge was stable.  Jerome Grady had current v ital signs as follows:  BP (!) 174/105   Pulse 81   Resp 12   Wt 70.2 kg (154 lb 12.2 oz)                Follow-up Information    None         Patient's Medications   Discharge Prescriptions    No medications on file       No discharge procedures on file.    PDMP Review       None            ED Provider  Electronically Signed by             Boris Irizarry PA-C  08/23/24 1350       Boris Irizarry PA-C  08/23/24 1358

## 2024-08-24 LAB
ATRIAL RATE: 73 BPM
P AXIS: 70 DEGREES
PR INTERVAL: 114 MS
QRS AXIS: 90 DEGREES
QRSD INTERVAL: 88 MS
QT INTERVAL: 392 MS
QTC INTERVAL: 431 MS
T WAVE AXIS: 36 DEGREES
VENTRICULAR RATE: 73 BPM

## 2024-08-24 PROCEDURE — 93010 ELECTROCARDIOGRAM REPORT: CPT | Performed by: STUDENT IN AN ORGANIZED HEALTH CARE EDUCATION/TRAINING PROGRAM

## 2024-09-02 ENCOUNTER — HOSPITAL ENCOUNTER (EMERGENCY)
Facility: HOSPITAL | Age: 39
End: 2024-09-02
Attending: EMERGENCY MEDICINE
Payer: OTHER GOVERNMENT

## 2024-09-02 ENCOUNTER — HOSPITAL ENCOUNTER (INPATIENT)
Facility: HOSPITAL | Age: 39
LOS: 2 days | Discharge: LEFT BEFORE DISCHARGE | DRG: 254 | End: 2024-09-04
Attending: STUDENT IN AN ORGANIZED HEALTH CARE EDUCATION/TRAINING PROGRAM | Admitting: STUDENT IN AN ORGANIZED HEALTH CARE EDUCATION/TRAINING PROGRAM
Payer: OTHER GOVERNMENT

## 2024-09-02 VITALS
SYSTOLIC BLOOD PRESSURE: 105 MMHG | DIASTOLIC BLOOD PRESSURE: 77 MMHG | HEART RATE: 60 BPM | RESPIRATION RATE: 18 BRPM | OXYGEN SATURATION: 100 % | WEIGHT: 154.76 LBS | TEMPERATURE: 98.4 F

## 2024-09-02 DIAGNOSIS — K62.5 RECTAL BLEEDING: Primary | ICD-10-CM

## 2024-09-02 DIAGNOSIS — F20.9 SCHIZOPHRENIA (HCC): ICD-10-CM

## 2024-09-02 DIAGNOSIS — Z01.89 ENCOUNTER FOR COMPETENCY EVALUATION: ICD-10-CM

## 2024-09-02 DIAGNOSIS — K92.1 HEMATOCHEZIA: Primary | ICD-10-CM

## 2024-09-02 PROBLEM — F19.10 SUBSTANCE ABUSE (HCC): Status: ACTIVE | Noted: 2024-09-02

## 2024-09-02 LAB
ABO GROUP BLD: NORMAL
ABO GROUP BLD: NORMAL
ANION GAP SERPL CALCULATED.3IONS-SCNC: 7 MMOL/L (ref 4–13)
BASOPHILS # BLD AUTO: 0.03 THOUSANDS/ÂΜL (ref 0–0.1)
BASOPHILS NFR BLD AUTO: 1 % (ref 0–1)
BLD GP AB SCN SERPL QL: NEGATIVE
BUN SERPL-MCNC: 10 MG/DL (ref 5–25)
CALCIUM SERPL-MCNC: 9.1 MG/DL (ref 8.4–10.2)
CHLORIDE SERPL-SCNC: 100 MMOL/L (ref 96–108)
CO2 SERPL-SCNC: 30 MMOL/L (ref 21–32)
CREAT SERPL-MCNC: 1.04 MG/DL (ref 0.6–1.3)
EOSINOPHIL # BLD AUTO: 0.11 THOUSAND/ÂΜL (ref 0–0.61)
EOSINOPHIL NFR BLD AUTO: 2 % (ref 0–6)
ERYTHROCYTE [DISTWIDTH] IN BLOOD BY AUTOMATED COUNT: 12.1 % (ref 11.6–15.1)
GFR SERPL CREATININE-BSD FRML MDRD: 91 ML/MIN/1.73SQ M
GLUCOSE SERPL-MCNC: 85 MG/DL (ref 65–140)
HCT VFR BLD AUTO: 34.7 % (ref 36.5–49.3)
HGB BLD-MCNC: 11.7 G/DL (ref 12–17)
IMM GRANULOCYTES # BLD AUTO: 0.01 THOUSAND/UL (ref 0–0.2)
IMM GRANULOCYTES NFR BLD AUTO: 0 % (ref 0–2)
INR PPP: 0.98 (ref 0.85–1.19)
LYMPHOCYTES # BLD AUTO: 2.02 THOUSANDS/ÂΜL (ref 0.6–4.47)
LYMPHOCYTES NFR BLD AUTO: 39 % (ref 14–44)
MCH RBC QN AUTO: 29.7 PG (ref 26.8–34.3)
MCHC RBC AUTO-ENTMCNC: 33.7 G/DL (ref 31.4–37.4)
MCV RBC AUTO: 88 FL (ref 82–98)
MONOCYTES # BLD AUTO: 0.59 THOUSAND/ÂΜL (ref 0.17–1.22)
MONOCYTES NFR BLD AUTO: 11 % (ref 4–12)
NEUTROPHILS # BLD AUTO: 2.42 THOUSANDS/ÂΜL (ref 1.85–7.62)
NEUTS SEG NFR BLD AUTO: 47 % (ref 43–75)
NRBC BLD AUTO-RTO: 0 /100 WBCS
PLATELET # BLD AUTO: 306 THOUSANDS/UL (ref 149–390)
PMV BLD AUTO: 9.4 FL (ref 8.9–12.7)
POTASSIUM SERPL-SCNC: 3.6 MMOL/L (ref 3.5–5.3)
PROTHROMBIN TIME: 13.3 SECONDS (ref 12.3–15)
RBC # BLD AUTO: 3.94 MILLION/UL (ref 3.88–5.62)
RH BLD: POSITIVE
RH BLD: POSITIVE
SODIUM SERPL-SCNC: 137 MMOL/L (ref 135–147)
SPECIMEN EXPIRATION DATE: NORMAL
WBC # BLD AUTO: 5.18 THOUSAND/UL (ref 4.31–10.16)

## 2024-09-02 PROCEDURE — 99284 EMERGENCY DEPT VISIT MOD MDM: CPT

## 2024-09-02 PROCEDURE — 85610 PROTHROMBIN TIME: CPT | Performed by: EMERGENCY MEDICINE

## 2024-09-02 PROCEDURE — 86850 RBC ANTIBODY SCREEN: CPT | Performed by: EMERGENCY MEDICINE

## 2024-09-02 PROCEDURE — 80048 BASIC METABOLIC PNL TOTAL CA: CPT | Performed by: EMERGENCY MEDICINE

## 2024-09-02 PROCEDURE — 99285 EMERGENCY DEPT VISIT HI MDM: CPT | Performed by: EMERGENCY MEDICINE

## 2024-09-02 PROCEDURE — 86901 BLOOD TYPING SEROLOGIC RH(D): CPT | Performed by: EMERGENCY MEDICINE

## 2024-09-02 PROCEDURE — 86900 BLOOD TYPING SEROLOGIC ABO: CPT | Performed by: EMERGENCY MEDICINE

## 2024-09-02 PROCEDURE — 36415 COLL VENOUS BLD VENIPUNCTURE: CPT | Performed by: EMERGENCY MEDICINE

## 2024-09-02 PROCEDURE — 85025 COMPLETE CBC W/AUTO DIFF WBC: CPT | Performed by: EMERGENCY MEDICINE

## 2024-09-02 RX ORDER — ALPRAZOLAM 0.25 MG
0.25 TABLET ORAL ONCE
Status: COMPLETED | OUTPATIENT
Start: 2024-09-03 | End: 2024-09-03

## 2024-09-02 NOTE — ED PROVIDER NOTES
"History  Chief Complaint   Patient presents with    Rectal Bleeding     Pt arrives via EMS from [facility] with c/o rectal bleeding     38 year old male, here from local [facility]. Rectal bleeding that started yesterday. Admits to having penetration of rectum with his own figner, otherwise denies trauma. Does have paranoid thoughts about a CT scan he had a few days ago, with the contrast \"beign stuck inside of him\". Denies SI/HI. Describes clots and BRB in the toilet multiple times. No fevers, no history of previous rectal bleeding.           Prior to Admission Medications   Prescriptions Last Dose Informant Patient Reported? Taking?   buprenorphine-naloxone (SUBOXONE) 8-2 mg per SL tablet   Yes No   Sig: Place 1 tablet under the tongue 2 (two) times a day      Facility-Administered Medications: None       Past Medical History:   Diagnosis Date    Hypertension        No past surgical history on file.    No family history on file.  I have reviewed and agree with the history as documented.    E-Cigarette/Vaping     E-Cigarette/Vaping Substances     Social History     Tobacco Use    Smoking status: Every Day     Current packs/day: 0.25     Types: Cigarettes    Smokeless tobacco: Never   Substance Use Topics    Alcohol use: Not Currently    Drug use: Not Currently       Review of Systems   Constitutional: Negative.  Negative for chills and fever.   HENT: Negative.  Negative for rhinorrhea, sore throat, trouble swallowing and voice change.    Eyes: Negative.  Negative for pain and visual disturbance.   Respiratory: Negative.  Negative for cough, shortness of breath and wheezing.    Cardiovascular: Negative.  Negative for chest pain and palpitations.   Gastrointestinal:  Positive for anal bleeding and blood in stool. Negative for abdominal pain, diarrhea, nausea and vomiting.   Genitourinary: Negative.  Negative for dysuria and frequency.   Musculoskeletal: Negative.  Negative for neck pain and neck stiffness. "   Skin: Negative.  Negative for rash.   Neurological: Negative.  Negative for dizziness, speech difficulty, weakness, light-headedness and numbness.       Physical Exam  Physical Exam  Vitals and nursing note reviewed. Exam conducted with a chaperone present (ED Reuben Hanks).   Constitutional:       General: He is not in acute distress.     Appearance: He is well-developed.   HENT:      Head: Normocephalic and atraumatic.   Eyes:      Conjunctiva/sclera: Conjunctivae normal.      Pupils: Pupils are equal, round, and reactive to light.   Neck:      Trachea: No tracheal deviation.   Cardiovascular:      Rate and Rhythm: Normal rate and regular rhythm.   Pulmonary:      Effort: Pulmonary effort is normal. No respiratory distress.      Breath sounds: Normal breath sounds. No wheezing or rales.   Abdominal:      General: Bowel sounds are normal. There is no distension.      Palpations: Abdomen is soft.      Tenderness: There is no abdominal tenderness. There is no guarding or rebound.   Genitourinary:     Comments: No external hemorrhoids or fissures appreciated. No blood seen at rectal opening. KIRK performed, BRB on glove. Hemoccult positive.   Musculoskeletal:         General: No tenderness or deformity. Normal range of motion.      Cervical back: Normal range of motion and neck supple.   Skin:     General: Skin is warm and dry.      Capillary Refill: Capillary refill takes less than 2 seconds.      Findings: No rash.   Neurological:      Mental Status: He is alert and oriented to person, place, and time.   Psychiatric:         Behavior: Behavior normal.         Vital Signs  ED Triage Vitals [09/02/24 1744]   Temperature Pulse Respirations Blood Pressure SpO2   98.4 °F (36.9 °C) 81 18 138/91 96 %      Temp Source Heart Rate Source Patient Position - Orthostatic VS BP Location FiO2 (%)   Oral Monitor Sitting Left arm --      Pain Score       --           Vitals:    09/02/24 1744   BP: 138/91   Pulse: 81   Patient  Position - Orthostatic VS: Sitting         Visual Acuity      ED Medications  Medications - No data to display    Diagnostic Studies  Results Reviewed       Procedure Component Value Units Date/Time    Basic metabolic panel [[id number]] Collected: 09/02/24 1800    Lab Status: Final result Specimen: Blood from Arm, Left Updated: 09/02/24 1823     Sodium 137 mmol/L      Potassium 3.6 mmol/L      Chloride 100 mmol/L      CO2 30 mmol/L      ANION GAP 7 mmol/L      BUN 10 mg/dL      Creatinine 1.04 mg/dL      Glucose 85 mg/dL      Calcium 9.1 mg/dL      eGFR 91 ml/min/1.73sq m     Narrative:      National Kidney Disease Foundation guidelines for Chronic Kidney Disease (CKD):     Stage 1 with normal or high GFR (GFR > 90 mL/min/1.73 square meters)    Stage 2 Mild CKD (GFR = 60-89 mL/min/1.73 square meters)    Stage 3A Moderate CKD (GFR = 45-59 mL/min/1.73 square meters)    Stage 3B Moderate CKD (GFR = 30-44 mL/min/1.73 square meters)    Stage 4 Severe CKD (GFR = 15-29 mL/min/1.73 square meters)    Stage 5 End Stage CKD (GFR <15 mL/min/1.73 square meters)  Note: GFR calculation is accurate only with a steady state creatinine    Protime-INR [[id number]]  (Normal) Collected: 09/02/24 1800    Lab Status: Final result Specimen: Blood from Arm, Left Updated: 09/02/24 1821     Protime 13.3 seconds      INR 0.98    Narrative:      INR Therapeutic Range    Indication                                             INR Range      Atrial Fibrillation                                               2.0-3.0  Hypercoagulable State                                    2.0.2.3  Left Ventricular Asist Device                            2.0-3.0  Mechanical Heart Valve                                  -    Aortic(with afib, MI, embolism, HF, LA enlargement,    and/or coagulopathy)                                     2.0-3.0 (2.5-3.5)     Mitral                                                             2.5-3.5  Prosthetic/Bioprosthetic Heart Valve     "           2.0-3.0  Venous thromboembolism (VTE: VT, PE        2.0-3.0    CBC and differential [[id number]]  (Abnormal) Collected: 09/02/24 1800    Lab Status: Final result Specimen: Blood from Arm, Left Updated: 09/02/24 1808     WBC 5.18 Thousand/uL      RBC 3.94 Million/uL      Hemoglobin 11.7 g/dL      Hematocrit 34.7 %      MCV 88 fL      MCH 29.7 pg      MCHC 33.7 g/dL      RDW 12.1 %      MPV 9.4 fL      Platelets 306 Thousands/uL      nRBC 0 /100 WBCs      Segmented % 47 %      Immature Grans % 0 %      Lymphocytes % 39 %      Monocytes % 11 %      Eosinophils Relative 2 %      Basophils Relative 1 %      Absolute Neutrophils 2.42 Thousands/µL      Absolute Immature Grans 0.01 Thousand/uL      Absolute Lymphocytes 2.02 Thousands/µL      Absolute Monocytes 0.59 Thousand/µL      Eosinophils Absolute 0.11 Thousand/µL      Basophils Absolute 0.03 Thousands/µL                    No orders to display              Procedures  Procedures         ED Course  ED Course as of 09/02/24 2017   Mon Sep 02, 2024   1755 No gross blood or hemorrhage on rectal exam w/ ED Tech neetu chaperraz. Armenian speaking, prefers French speaking staff. Patient with hx of schizophrenia per medical records, history difficult to obtain.  Patient stats he saw \" a body, head and legs come from his backside while trying to poop\". Alleged no BM in 12 days. Also endorses using his finger to pleasure his rectum and  to \"dig out the contrast from a CT done a few days ago.\"  Denies any SI/HI. Denies and AH/VH.  No AC use. No previous hx of rectal bleeding per patient. Has never had a colonoscopy.    1913 Patient asked me to view his toilet after having a second BM in ED. Moderate volume of blood in toilet, some clots noted. Will plan to discuss with GI for observation admission and possible colonoscopy.                                 SBIRT 22yo+      Flowsheet Row Most Recent Value   Initial Alcohol Screen: US AUDIT-C     1. How often do you have " a drink containing alcohol? 0 Filed at: 09/02/2024 1922   2. How many drinks containing alcohol do you have on a typical day you are drinking?  0 Filed at: 09/02/2024 1922   3a. Male UNDER 65: How often do you have five or more drinks on one occasion? 0 Filed at: 09/02/2024 1922   Audit-C Score 0 Filed at: 09/02/2024 1922   JARROD: How many times in the past year have you...    Used an illegal drug or used a prescription medication for non-medical reasons? Never Filed at: 09/02/2024 1922                      Medical Decision Making  HGB stable, multiple BM with BRB in ED. Given active bleeding, will admit. Discussed with GI, Wants patietn at facility with higher level of care and possible urgent colonoscopy if needed. Patient agreeable with plan.     Amount and/or Complexity of Data Reviewed  Labs: ordered.                 Disposition  Final diagnoses:   Rectal bleeding     Time reflects when diagnosis was documented in both MDM as applicable and the Disposition within this note       Time User Action Codes Description Comment    9/2/2024  6:42 PM Curtis Garza Add [K62.5] Rectal bleeding           ED Disposition       ED Disposition   Transfer to Another Facility-In Network    Condition   --    Date/Time   Mon Sep 2, 2024 1927    Comment   [facility]                    MD Documentation      Flowsheet Row Most Recent Value   Patient Condition The patient has been stabilized such that within reasonable medical probability, no material deterioration of the patient condition or the condition of the unborn child(hien) is likely to result from the transfer   Accepting Physician Dr. Rachid Esquivel   Accepting Facility Name, [facility]   Sending MD Dr. Brandt Garza   Provider Certification General risk, such as traffic hazards, adverse weather conditions, rough terrain or turbulence, possible failure of equipment (including vehicle or  aircraft), or consequences of actions of persons outside the control of the transport personnel          RN Documentation      Flowsheet Row Most Recent Value   Accepting Facility Name, [facility]   Level of Care Basic life support          Follow-up Information       Follow up With Specialties Details Why Contact Info Additional Information    [facility] In 1 week  [address], [address]  [place] [facility] [place] [address] [phone] [facility], [address] [place] [address], [address], [place], [place], [address] [phone]    [facility] [place] [facility]   [address]  [place] [address] [phone] [facility] [place], [facility] [address], [place], [place], [address] [phone]            Patient's Medications   Discharge Prescriptions    No medications on file       No discharge procedures on file.    PDMP Review       None            ED Provider  Electronically Signed by             Curtis Garza DO  09/02/24 2018

## 2024-09-03 ENCOUNTER — APPOINTMENT (INPATIENT)
Dept: CT IMAGING | Facility: HOSPITAL | Age: 39
DRG: 254 | End: 2024-09-03
Payer: OTHER GOVERNMENT

## 2024-09-03 ENCOUNTER — APPOINTMENT (INPATIENT)
Dept: GASTROENTEROLOGY | Facility: HOSPITAL | Age: 39
DRG: 254 | End: 2024-09-03
Payer: OTHER GOVERNMENT

## 2024-09-03 ENCOUNTER — TELEPHONE (OUTPATIENT)
Dept: PSYCHIATRY | Facility: CLINIC | Age: 39
End: 2024-09-03

## 2024-09-03 LAB
ANION GAP SERPL CALCULATED.3IONS-SCNC: 4 MMOL/L (ref 4–13)
BASOPHILS # BLD AUTO: 0.01 THOUSANDS/ÂΜL (ref 0–0.1)
BASOPHILS NFR BLD AUTO: 0 % (ref 0–1)
BUN SERPL-MCNC: 9 MG/DL (ref 5–25)
CALCIUM SERPL-MCNC: 8.4 MG/DL (ref 8.4–10.2)
CHLORIDE SERPL-SCNC: 104 MMOL/L (ref 96–108)
CO2 SERPL-SCNC: 32 MMOL/L (ref 21–32)
CREAT SERPL-MCNC: 1.02 MG/DL (ref 0.6–1.3)
EOSINOPHIL # BLD AUTO: 0.11 THOUSAND/ÂΜL (ref 0–0.61)
EOSINOPHIL NFR BLD AUTO: 2 % (ref 0–6)
ERYTHROCYTE [DISTWIDTH] IN BLOOD BY AUTOMATED COUNT: 12.2 % (ref 11.6–15.1)
GFR SERPL CREATININE-BSD FRML MDRD: 94 ML/MIN/1.73SQ M
GLUCOSE SERPL-MCNC: 94 MG/DL (ref 65–140)
HCT VFR BLD AUTO: 26.9 % (ref 36.5–49.3)
HGB BLD-MCNC: 8.4 G/DL (ref 12–17)
HGB BLD-MCNC: 9 G/DL (ref 12–17)
IMM GRANULOCYTES # BLD AUTO: 0.01 THOUSAND/UL (ref 0–0.2)
IMM GRANULOCYTES NFR BLD AUTO: 0 % (ref 0–2)
LYMPHOCYTES # BLD AUTO: 1.79 THOUSANDS/ÂΜL (ref 0.6–4.47)
LYMPHOCYTES NFR BLD AUTO: 36 % (ref 14–44)
MAGNESIUM SERPL-MCNC: 2 MG/DL (ref 1.9–2.7)
MCH RBC QN AUTO: 29.4 PG (ref 26.8–34.3)
MCHC RBC AUTO-ENTMCNC: 33.5 G/DL (ref 31.4–37.4)
MCV RBC AUTO: 88 FL (ref 82–98)
MONOCYTES # BLD AUTO: 0.45 THOUSAND/ÂΜL (ref 0.17–1.22)
MONOCYTES NFR BLD AUTO: 9 % (ref 4–12)
NEUTROPHILS # BLD AUTO: 2.55 THOUSANDS/ÂΜL (ref 1.85–7.62)
NEUTS SEG NFR BLD AUTO: 53 % (ref 43–75)
NRBC BLD AUTO-RTO: 0 /100 WBCS
PLATELET # BLD AUTO: 258 THOUSANDS/UL (ref 149–390)
PMV BLD AUTO: 9.9 FL (ref 8.9–12.7)
POTASSIUM SERPL-SCNC: 3.9 MMOL/L (ref 3.5–5.3)
RBC # BLD AUTO: 3.06 MILLION/UL (ref 3.88–5.62)
SODIUM SERPL-SCNC: 140 MMOL/L (ref 135–147)
WBC # BLD AUTO: 4.92 THOUSAND/UL (ref 4.31–10.16)

## 2024-09-03 PROCEDURE — 80048 BASIC METABOLIC PNL TOTAL CA: CPT

## 2024-09-03 PROCEDURE — 85018 HEMOGLOBIN: CPT | Performed by: STUDENT IN AN ORGANIZED HEALTH CARE EDUCATION/TRAINING PROGRAM

## 2024-09-03 PROCEDURE — 74176 CT ABD & PELVIS W/O CONTRAST: CPT

## 2024-09-03 PROCEDURE — 85025 COMPLETE CBC W/AUTO DIFF WBC: CPT

## 2024-09-03 PROCEDURE — 99222 1ST HOSP IP/OBS MODERATE 55: CPT | Performed by: INTERNAL MEDICINE

## 2024-09-03 PROCEDURE — 83735 ASSAY OF MAGNESIUM: CPT

## 2024-09-03 PROCEDURE — 99222 1ST HOSP IP/OBS MODERATE 55: CPT

## 2024-09-03 RX ORDER — BUPRENORPHINE AND NALOXONE 8; 2 MG/1; MG/1
8 FILM, SOLUBLE BUCCAL; SUBLINGUAL DAILY
Status: DISCONTINUED | OUTPATIENT
Start: 2024-09-03 | End: 2024-09-04 | Stop reason: HOSPADM

## 2024-09-03 RX ORDER — PANTOPRAZOLE SODIUM 40 MG/10ML
40 INJECTION, POWDER, LYOPHILIZED, FOR SOLUTION INTRAVENOUS EVERY 12 HOURS
Status: DISCONTINUED | OUTPATIENT
Start: 2024-09-03 | End: 2024-09-04 | Stop reason: HOSPADM

## 2024-09-03 RX ORDER — SODIUM CHLORIDE, SODIUM GLUCONATE, SODIUM ACETATE, POTASSIUM CHLORIDE, MAGNESIUM CHLORIDE, SODIUM PHOSPHATE, DIBASIC, AND POTASSIUM PHOSPHATE .53; .5; .37; .037; .03; .012; .00082 G/100ML; G/100ML; G/100ML; G/100ML; G/100ML; G/100ML; G/100ML
100 INJECTION, SOLUTION INTRAVENOUS CONTINUOUS
Status: DISCONTINUED | OUTPATIENT
Start: 2024-09-03 | End: 2024-09-04 | Stop reason: HOSPADM

## 2024-09-03 RX ORDER — LORAZEPAM 2 MG/ML
1 INJECTION INTRAMUSCULAR ONCE
Status: DISCONTINUED | OUTPATIENT
Start: 2024-09-03 | End: 2024-09-03

## 2024-09-03 RX ORDER — LORAZEPAM 2 MG/ML
1 INJECTION INTRAMUSCULAR ONCE AS NEEDED
Status: DISCONTINUED | OUTPATIENT
Start: 2024-09-03 | End: 2024-09-04 | Stop reason: HOSPADM

## 2024-09-03 RX ORDER — BISACODYL 5 MG/1
10 TABLET, DELAYED RELEASE ORAL EVERY 4 HOURS
Status: DISPENSED | OUTPATIENT
Start: 2024-09-03 | End: 2024-09-04

## 2024-09-03 RX ORDER — POLYETHYLENE GLYCOL 3350 17 G/17G
238 POWDER, FOR SOLUTION ORAL ONCE
Status: DISCONTINUED | OUTPATIENT
Start: 2024-09-03 | End: 2024-09-04 | Stop reason: HOSPADM

## 2024-09-03 RX ORDER — POTASSIUM CHLORIDE 1500 MG/1
40 TABLET, EXTENDED RELEASE ORAL ONCE
Status: COMPLETED | OUTPATIENT
Start: 2024-09-03 | End: 2024-09-03

## 2024-09-03 RX ADMIN — PANTOPRAZOLE SODIUM 40 MG: 40 INJECTION, POWDER, FOR SOLUTION INTRAVENOUS at 00:57

## 2024-09-03 RX ADMIN — BUPRENORPHINE AND NALOXONE 8 MG: 8; 2 FILM BUCCAL; SUBLINGUAL at 10:36

## 2024-09-03 RX ADMIN — POTASSIUM CHLORIDE 40 MEQ: 1500 TABLET, EXTENDED RELEASE ORAL at 00:57

## 2024-09-03 RX ADMIN — SODIUM CHLORIDE, SODIUM GLUCONATE, SODIUM ACETATE, POTASSIUM CHLORIDE, MAGNESIUM CHLORIDE, SODIUM PHOSPHATE, DIBASIC, AND POTASSIUM PHOSPHATE 100 ML/HR: .53; .5; .37; .037; .03; .012; .00082 INJECTION, SOLUTION INTRAVENOUS at 00:57

## 2024-09-03 RX ADMIN — PANTOPRAZOLE SODIUM 40 MG: 40 INJECTION, POWDER, FOR SOLUTION INTRAVENOUS at 12:00

## 2024-09-03 RX ADMIN — POLYETHYLENE GLYCOL 3350, SODIUM SULFATE ANHYDROUS, SODIUM BICARBONATE, SODIUM CHLORIDE, POTASSIUM CHLORIDE 4000 ML: 236; 22.74; 6.74; 5.86; 2.97 POWDER, FOR SOLUTION ORAL at 02:51

## 2024-09-03 RX ADMIN — ALPRAZOLAM 0.25 MG: 0.25 TABLET ORAL at 01:07

## 2024-09-03 NOTE — PLAN OF CARE
Problem: GASTROINTESTINAL - ADULT  Goal: Maintains or returns to baseline bowel function  Description: INTERVENTIONS:  - Assess bowel function  - Encourage oral fluids to ensure adequate hydration  - Administer IV fluids if ordered to ensure adequate hydration  - Administer ordered medications as needed  - Encourage mobilization and activity  - Consider nutritional services referral to assist patient with adequate nutrition and appropriate food choices  Outcome: Progressing     Problem: HEMATOLOGIC - ADULT  Goal: Maintains hematologic stability  Description: INTERVENTIONS  - Assess for signs and symptoms of bleeding or hemorrhage  - Monitor labs  - Administer supportive blood products/factors as ordered and appropriate  Outcome: Progressing     Problem: PAIN - ADULT  Goal: Verbalizes/displays adequate comfort level or baseline comfort level  Description: Interventions:  - Encourage patient to monitor pain and request assistance  - Assess pain using appropriate pain scale  - Administer analgesics based on type and severity of pain and evaluate response  - Implement non-pharmacological measures as appropriate and evaluate response  - Consider cultural and social influences on pain and pain management  - Notify physician/advanced practitioner if interventions unsuccessful or patient reports new pain  Outcome: Progressing     Problem: SAFETY ADULT  Goal: Patient will remain free of falls  Description: INTERVENTIONS:  - Educate patient/family on patient safety including physical limitations  - Instruct patient to call for assistance with activity   - Consult OT/PT to assist with strengthening/mobility   - Keep Call bell within reach  - Keep bed low and locked with side rails adjusted as appropriate  - Keep care items and personal belongings within reach  - Initiate and maintain comfort rounds  - Make Fall Risk Sign visible to staff  - Offer Toileting every 2 Hours, in advance of need  - Initiate/Maintain bed alarm (not  on, pt cuffed to bed and  present)  - Obtain necessary fall risk management equipment  - Apply yellow socks and bracelet for high fall risk patients  - Consider moving patient to room near nurses station  Outcome: Progressing

## 2024-09-03 NOTE — EMTALA/ACUTE CARE TRANSFER
[facility] EMERGENCY DEPARTMENT  [address] [address]  [phone]  Dept: [phone]      EMTALA TRANSFER CONSENT    NAME Jerome Grady                                         1985                              MRN [id number]    I have been informed of my rights regarding examination, treatment, and transfer   by Dr. Curtis Garza DO    Benefits:  GI, Colonoscopy    Risks:  worsening condition      Consent for Transfer:  I acknowledge that my medical condition has been evaluated and explained to me by the emergency department physician or other qualified medical person and/or my attending physician, who has recommended that I be transferred to the service of  Accepting Physician: Dr. Rachid Esquivel at Accepting Facility Name, City & State : [facility]. The above potential benefits of such transfer, the potential risks associated with such transfer, and the probable risks of not being transferred have been explained to me, and I fully understand them.  The doctor has explained that, in my case, the benefits of transfer outweigh the risks.  I agree to be transferred.    I authorize the performance of emergency medical procedures and treatments upon me in both transit and upon arrival at the receiving facility.  Additionally, I authorize the release of any and all medical records to the receiving facility and request they be transported with me, if possible.  I understand that the safest mode of transportation during a medical emergency is an ambulance and that the Hospital advocates the use of this mode of transport. Risks of traveling to the receiving facility by car, including absence of medical control, life sustaining equipment, such as oxygen, and medical personnel has been explained to me and I fully understand them.    (DARIN CORRECT BOX BELOW)  [  ]  I consent to the stated transfer and to be transported by ambulance/helicopter.  [  ]  I  consent to the stated transfer, but refuse transportation by ambulance and accept full responsibility for my transportation by car.  I understand the risks of non-ambulance transfers and I exonerate the Hospital and its staff from any deterioration in my condition that results from this refusal.    X___________________________________________    DATE  24  TIME________  Signature of patient or legally responsible individual signing on patient behalf           RELATIONSHIP TO PATIENT_________________________          Provider Certification    NAME Jerome Grady                                         1985                              MRN [id number]    A medical screening exam was performed on the above named patient.  Based on the examination:    Condition Necessitating Transfer The encounter diagnosis was Rectal bleeding.    Patient Condition: The patient has been stabilized such that within reasonable medical probability, no material deterioration of the patient condition or the condition of the unborn child(hien) is likely to result from the transfer    Reason for Transfer:      Transfer Requirements: Facility [facility]   Space available and qualified personnel available for treatment as acknowledged by    Agreed to accept transfer and to provide appropriate medical treatment as acknowledged by       Dr. Rachid Esquivel  Appropriate medical records of the examination and treatment of the patient are provided at the time of transfer   STAFF INITIAL WHEN COMPLETED _______  Transfer will be performed by qualified personnel from    and appropriate transfer equipment as required, including the use of necessary and appropriate life support measures.    Provider Certification: I have examined the patient and explained the following risks and benefits of being transferred/refusing transfer to the patient/family:  General risk, such as traffic hazards, adverse weather conditions, rough terrain or  turbulence, possible failure of equipment (including vehicle or aircraft), or consequences of actions of persons outside the control of the transport personnel      Based on these reasonable risks and benefits to the patient and/or the unborn child(hien), and based upon the information available at the time of the patient’s examination, I certify that the medical benefits reasonably to be expected from the provision of appropriate medical treatments at another medical facility outweigh the increasing risks, if any, to the individual’s medical condition, and in the case of labor to the unborn child, from effecting the transfer.    X____________________________________________ DATE 09/02/24        TIME_______      ORIGINAL - SEND TO MEDICAL RECORDS   COPY - SEND WITH PATIENT DURING TRANSFER

## 2024-09-03 NOTE — CONSULTS
"    [facility] - Inpatient Consultation    PATIENT INFORMATION      Jerome Grady 38 y.o. male MRN: [id number]  Unit/Bed#: E2 -01 Encounter: [id number]  PCP: No primary care provider on file.  Date of Admission:  9/2/2024  Date of Consultation: 09/03/24  Requesting Physician: Garrison Mackey MD       ASSESSMENT & PLAN     Jerome Grady is a 38 y.o. male with PMH significant for hypertension, schizophrenia not on medication, opioid use disorder on Suboxone, and recent admission for suspected overdose when patient was found unresponsive and responded to Narcan who presented to [facility] on 9/2  for concern of rectal bleeding with clots. Transferred to [facility] for higher level of care with recommendation for bowel prep and colonoscopy today. GI consulted for further evaluation and management    Hematochezia  Constipation  Opioid Use Disorder on Suboxone  Patient reports 1 day of BRBPR with clots leading to presentation. This is after 10 days of constipation requiring disimpaction by patient (utilized finger and \"stick\"). Episodes witnessed in ER and GI consulted who recommended transfer to [facility] for higher level of care with bowel prep and colonoscopy today. Patient without prior colonoscopy. Hgb 11.7 on presentation and downtrended to 9 on AM labs, prior baseline around 12. CT on presentation without contrast reviewed personally without acute finding, however, with moderate stool burden. Fortunately, hematochezia has cleared with prep.  Unclear cause of hematochezia - possible ischemic in setting of recent overdose vs. Stercoral ulcer with recent constipation vs iatrogenic given recent disimpaction vs.Diverticular vs. Possibly outlet bleeding, although with drop in hgb less likely vs. Less likely Malignancy  Patient completed majority of bowel prep, still with brown liquid stool. He is refusing to continue with additional prep and has history of leaving " "AMA.   Will plan for colonoscopy today for further evaluation, although evaluation may be limited due to poor prep  Please keep NPO pending procedures  Further recommendations to follow      REASON FOR CONSULTATION      Hematochexia    HISTORY OF PRESENT ILLNESS      Jerome Grady is a 38 y.o. male with PMH significant for hypertension, schizophrenia not on medication, opioid use disorder on Suboxone, and recent admission for suspected overdose when patient was found unresponsive and responded to Narcan who presented to [facility] on 9/2  for concern of rectal bleeding with clots.    Patient reported since he was previously in the hospital on 8/23, he has had significant constipation leading to him trying to disimpact himself both digitally and with \"stick\".  He was able to have bowel movements after disimpaction, but perseverates on IV contrast causing his initial constipation.  No prior episodes of hematochezia the patient is able to report.  Due to significant number of bowel movements with bright red blood and clots it was recommended to transfer to [facility] for colonoscopy and higher level of care.    Initially, vital signs stable.  Labs significant for hemoglobin of 11.7 which down trended to 9.0 on repeat labs this morning.  All cell lines decreased on repeat labs.  CT scan on presentation without acute findings in the abdomen or pelvis.  On personal review, patient does have significant amount of constipation.    Patient was started on GoLytely bowel prep on arrival to [facility], and has completed at least three quarters of prep.  He reports that there is no longer blood in his stool.  Bedside RN reports that stool is brown, but all watery.      REVIEW OF SYSTEMS     CONSTITUTIONAL: Denies any fever, chills, rigors, and weight loss  HEENT: No earache or tinnitus, denies hearing loss or visual disturbances  CARDIOVASCULAR: No chest pain or palpitations " "  RESPIRATORY: Denies any cough, hemoptysis, shortness of breath or dyspnea on exertion  GASTROINTESTINAL: As noted in the History of Present Illness   GENITOURINARY: No problems with urination, denies any hematuria or dysuria  NEUROLOGIC: No dizziness or vertigo, denies headaches   MUSCULOSKELETAL: Denies any muscle or joint pain   SKIN: Denies skin rashes or itching   ENDOCRINE: Denies excessive thirst, denies intolerance to heat or cold  PSYCHOSOCIAL: Denies depression or anxiety, denies any recent memory loss     PAST MEDICAL & SURGICAL HISTORY      Past Medical History:   Diagnosis Date    Hypertension        History reviewed. No pertinent surgical history.    MEDICATIONS & ALLERGIES       Medications:     Medications Prior to Admission:     buprenorphine-naloxone (SUBOXONE) 8-2 mg per SL tablet  Current Facility-Administered Medications   Medication Dose Route Frequency    buprenorphine-naloxone (Suboxone) film 8 mg  8 mg Sublingual Daily    multi-electrolyte (PLASMALYTE-A/ISOLYTE-S PH 7.4) IV solution  100 mL/hr Intravenous Continuous    nicotine (NICODERM CQ) 7 mg/24hr TD 24 hr patch 1 patch  1 patch Transdermal Daily    pantoprazole (PROTONIX) injection 40 mg  40 mg Intravenous Q12H       Allergies:   No Known Allergies    SOCIAL HISTORY      Social History     Marital Status: Single    Substance Use History:   Social History     Substance and Sexual Activity   Alcohol Use Not Currently     Social History     Tobacco Use   Smoking Status Every Day    Current packs/day: 0.25    Types: Cigarettes   Smokeless Tobacco Never     Social History     Substance and Sexual Activity   Drug Use Not Currently    Types: Fentanyl, Cocaine, \"Crack\" cocaine    Comment: August 4th last used - pt has been incarcerated       FAMILY HISTORY      History reviewed. No pertinent family history.    PHYSICAL EXAM     Objective   Blood pressure 116/83, pulse 67, temperature 97.5 °F (36.4 °C), temperature source Temporal, resp. rate " "17, height 5' 5\" (1.651 m), weight 67.8 kg (149 lb 7.6 oz), SpO2 96%. Body mass index is 24.87 kg/m².    Intake/Output Summary (Last 24 hours) at 9/3/2024 0849  Last data filed at 9/3/2024 0545  Gross per 24 hour   Intake 3480 ml   Output 500 ml   Net 2980 ml       General Appearance:   Alert, cooperative, no distress   HEENT:   Normocephalic, atraumatic, anicteric     Neck:   Supple, symmetrical, trachea midline   Lungs:   Equal chest rise, respirations unlabored    Heart:   Regular rate and rhythm   Abdomen:   Soft, non-tender, non-distended; normal bowel sounds; no masses, no organomegaly    Rectal:   Deferred    Extremities:   No cyanosis, clubbing or edema    Neuro:   Moves all 4 extremities    Skin:   No jaundice, rashes, or lesions      ADDITIONAL DATA     Lab Results:     Results from last 7 days   Lab Units 09/03/24  0717   WBC Thousand/uL 4.92   HEMOGLOBIN g/dL 9.0*   HEMATOCRIT % 26.9*   PLATELETS Thousands/uL 258   SEGS PCT % 53   LYMPHO PCT % 36   MONO PCT % 9   EOS PCT % 2     Results from last 7 days   Lab Units 09/03/24  0717   POTASSIUM mmol/L 3.9   CHLORIDE mmol/L 104   CO2 mmol/L 32   BUN mg/dL 9   CREATININE mg/dL 1.02   CALCIUM mg/dL 8.4     Results from last 7 days   Lab Units 09/02/24  1800   INR  0.98       Imaging:    CT abdomen pelvis wo contrast    Result Date: 9/3/2024  Narrative: CT ABDOMEN AND PELVIS WITHOUT IV CONTRAST INDICATION: GI bleed, rule out foreign body. COMPARISON: None. TECHNIQUE: CT examination of the abdomen and pelvis was performed without intravenous contrast. Multiplanar 2D reformatted images were created from the source data. This examination, like all CT scans performed in the [facility], was performed utilizing techniques to minimize radiation dose exposure, including the use of iterative reconstruction and automated exposure control. Radiation dose length product (DLP) for this visit: 408 mGy-cm Enteric Contrast: Not administered. FINDINGS: ABDOMEN " LOWER CHEST: No clinically significant abnormality in the visualized lower chest. LIVER/BILIARY TREE: Unremarkable. GALLBLADDER: No calcified gallstones. No pericholecystic inflammatory change. SPLEEN: Unremarkable. PANCREAS: Unremarkable. ADRENAL GLANDS: Unremarkable. KIDNEYS/URETERS: Unremarkable. No hydronephrosis. STOMACH AND BOWEL: Unremarkable. APPENDIX: No findings to suggest appendicitis. ABDOMINOPELVIC CAVITY: No ascites. No pneumoperitoneum. No lymphadenopathy. VESSELS: Unremarkable for patient's age. PELVIS REPRODUCTIVE ORGANS: Unremarkable for patient's age. URINARY BLADDER: Unremarkable. ABDOMINAL WALL/INGUINAL REGIONS: Small fat-containing umbilical hernia. BONES: No acute fracture or suspicious osseous lesion.     Impression: No acute findings in the abdomen or pelvis within the limits of unenhanced technique. No evidence of foreign body. Workstation performed: [id number]     CTA stroke alert (head/neck)    Result Date: 8/23/2024  Narrative: CTA NECK AND BRAIN WITH CONTRAST INDICATION: Stroke Alert COMPARISON:   Noncontrast stroke alert head CT on 8/23/2024 TECHNIQUE:  Post contrast imaging was performed after administration of iodinated contrast through the neck and brain. Post contrast axial 0.625 mm images timed to opacify the arterial system.  3D rendering was performed on an independent workstation.   MIP reconstructions performed. Radiation dose length product (DLP) for this visit:  820 mGy-cm .  This examination, like all [facility], was performed utilizing techniques to minimize radiation dose exposure, including the use of iterative reconstruction and automated exposure control. 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 Visualized arch and great vessels are normal. VERTEBRAL  ARTERIES: Widely patent, without stenosis or dissection. Left vertebral artery appears slightly dominant. RIGHT CAROTID: No atherosclerotic plaque identified. No stenosis.    No dissection. LEFT CAROTID: No atherosclerotic plaque identified. No stenosis.    No dissection. NASCET criteria was used to determine the degree of internal carotid artery diameter stenosis. CTA BRAIN: INTERNAL CAROTID ARTERIES: No stenosis or occlusion. ANTERIOR CEREBRAL ARTERY CIRCULATION:  No stenosis or occlusion. MIDDLE CEREBRAL ARTERY CIRCULATION:  No stenosis or occlusion. DISTAL VERTEBRAL ARTERIES:  No stenosis or occlusion. BASILAR ARTERY:  No stenosis or occlusion. POSTERIOR CEREBRAL ARTERIES: No stenosis or occlusion. VENOUS STRUCTURES:  Normal. NON VASCULAR ANATOMY BONY STRUCTURES: Old right lamina papyracea fracture. SOFT TISSUES OF THE NECK: Mild nuchal thickening of the bilateral maxillary sinuses and left greater than right ethmoid air cells. THORACIC INLET: Visualized lung apices are clear. There is an enlarged right hilar lymph node seen on series 3 image 598, incompletely included in the field-of-view and incomplete evaluated on this examination measuring up to 2.3 cm x 1.9 cm in maximal dimensions.     Impression: No large vessel occlusion, high-grade stenosis, or intracranial aneurysm identified on CT angiogram of the head. No hemodynamically significant stenosis or dissection identified on CT angiogram of the neck. Indeterminate enlarged right hilar lymph node measuring up to 2.3 cm in length dimensions. Recommend further evaluation with CT of the chest with contrast on a nonemergent basis. Findings were directly discussed with Dr. Willi Mercedes At 1:02 p.m. on 8/23/2024. Workstation performed: [id number]     CT stroke alert brain    Result Date: 8/23/2024  Narrative: CT BRAIN - STROKE ALERT PROTOCOL INDICATION:   Stroke Alert. COMPARISON:  None. TECHNIQUE:  CT examination of the brain was performed.  In addition to axial  images, coronal reformatted images were created and submitted for interpretation. Radiation dose length product (DLP) for this visit:  841 mGy-cm .  This examination, like all CT scans performed in the [facility], was performed utilizing techniques to minimize radiation dose exposure, including the use of iterative reconstruction and automated exposure control. IMAGE QUALITY:  Diagnostic. FINDINGS: PARENCHYMA:  No intracranial mass, mass effect or midline shift. No CT signs of acute infarction.  No acute parenchymal hemorrhage. Normal intracranial vasculature. VENTRICLES AND EXTRA-AXIAL SPACES:  Normal for the patient's age. VISUALIZED ORBITS: There is an old right lamina appreciated fracture. The orbits otherwise appear grossly normal. PARANASAL SINUSES: Old right lamina papyracea fracture, effacing the right ethmoid air cells. Mild mucosal thickening of the bilateral maxillary sinuses and left greater than right ethmoid air cells. Mastoid air cells are clear. CALVARIUM AND EXTRACRANIAL SOFT TISSUES:   Normal.     Impression: No acute intracranial abnormality. Old right lamina papyracea fracture. Findings were directly discussed with Dr Willi Mercedes at approximately 12:58 p.m. on 8/23/2024.. Workstation performed: [id number]       EKG, Pathology, and Other Studies Reviewed on Admission:   EKG: Reviewed      Counseling / Coordination of Care Time: 30 total mins spent in consult. Greater than 50% of total time spent on patient counseling and coordination of care.    Jessica Albrecht DO  Gastroenterology Fellow  [facility]  Division of Gastroenterology and Hepatology  Available on [employer]  ...............................................................................................................................................  ** Please Note: This note is constructed using a voice recognition dictation system. **

## 2024-09-03 NOTE — ASSESSMENT & PLAN NOTE
"Pt self reported history of schizophrenia and bipolar disorder during ED 8/2019. He reports at that time recently moving from [place] and no longer taking his antipsychotics x one month  Pt currently with paranoid thoughts and described visual hallucinations. He reports his stools contained \"babies\" and \"animals\"  Does not appear to currently be maintained on medications  Psych consult  "

## 2024-09-03 NOTE — UTILIZATION REVIEW
"Initial Clinical Review    Attention Clinical Reviewer: This patient is currently a prisoner.      Pt initially presented to [facility]. Pt was transferred by EMS to [facility] for a higher level of care.    Admission: Date/Time/Statement:   Admission Orders (From admission, onward)       Ordered        09/02/24 2354  INPATIENT ADMISSION  Once                          Orders Placed This Encounter   Procedures    INPATIENT ADMISSION     Standing Status:   Standing     Number of Occurrences:   1     Order Specific Question:   Level of Care     Answer:   Med Surg [16]     Order Specific Question:   Estimated length of stay     Answer:   More than 2 Midnights     Order Specific Question:   Certification     Answer:   I certify that inpatient services are medically necessary for this patient for a duration of greater than two midnights. See H&P and MD Progress Notes for additional information about the patient's course of treatment.       Initial Presentation: 38 y.o. male who presented by EMS to [facility] as a direct admission. Admitted as Inpatient for evaluation and treatment of hematochezia. PMHx: HTN. Presented w/ rectal bleeding that started on 9/1. Notably, paranoid thoughts of recent CT scan with the contrast \"being stuck inside of him.\" Reports clots and bright red blood in toilet multiple times. Also notes no BM for 11-12 days. Does note using a \"stick\" up his rectum to attempt to relieve constipation, also reports using finger for same. On exam, KIRK w/ bright red blood, hemoccult positive. 2 witnessed BM w/ bright red stools and clots noted in ED prior to transfer. Does endorse lightheadedness and dizziness, SOB, palpitations. Hgb 11.7. Plan: NPO after midnight, clear liquids, bowel prep, supportive care, Trend labs, replete electrolytes as needed. GI, Psychiatry consulted.    Anticipated Length of Stay/Certification Statement: Patient will be admitted on an inpatient basis " with an anticipated length of stay of greater than 2 midnights secondary to hematochezia.       Date: 09/03/24   Day 2: Notes no medications for past month since moving from [place], but reports hx of schizophrenia and bipolar disorder. Exam: dry mucous membranes, pallor. Hgb 9.0. Plan: continue neuro checks, NPO, trend H&H, IVF, continue Suboxone, supportive care, Trend labs, replete electrolytes as needed. IV PPI BID. KCl PO x1. Xanax PO x1.     GI: Unclear cause of hematochezia. Recent overdose vs ulcer w/ constipation. Completed marjority of bowel prep, still w/ brown liquid stool. Colonoscopy today. NPO.     Psychiatry: Pt does not want treatment for psychiatric conditions, believes he can manage psychiatric symptoms naturally. No evidence that pt is a danger to others, no suicidal ideation at this time. Recommend neuropsychology consult.     ED Triage Vitals   Temperature Pulse Respirations Blood Pressure SpO2 Pain Score   09/02/24 2356 09/02/24 2356 09/02/24 2356 09/02/24 2356 09/02/24 2315 09/02/24 2322   97.5 °F (36.4 °C) 67 17 116/83 96 % No Pain     Weight (last 2 days)       Date/Time Weight    09/02/24 2356 67.8 (149.47)            Vital Signs (last 3 days)       Date/Time Temp Pulse Resp BP SpO2 O2 Device McDade Coma Scale Score Pain    09/02/24 2356 97.5 °F (36.4 °C) 67 17 116/83 -- -- -- No Pain    09/02/24 2322 -- -- -- -- -- None (Room air) 15 No Pain    09/02/24 2315 -- -- -- -- 96 % None (Room air) 15 --              Pertinent Labs/Diagnostic Test Results:   Radiology:  CT abdomen pelvis wo contrast   Final Interpretation by Sujit Lara MD (09/03 0359)      No acute findings in the abdomen or pelvis within the limits of unenhanced technique. No evidence of foreign body.      Workstation performed: [id number]             GI:  No orders to display           Results from last 7 days   Lab Units 09/03/24  0717 09/02/24  1800   WBC Thousand/uL 4.92 5.18   HEMOGLOBIN g/dL 9.0* 11.7*    HEMATOCRIT % 26.9* 34.7*   PLATELETS Thousands/uL 258 306   TOTAL NEUT ABS Thousands/µL 2.55 2.42         Results from last 7 days   Lab Units 09/03/24  0717 09/02/24  1800   SODIUM mmol/L 140 137   POTASSIUM mmol/L 3.9 3.6   CHLORIDE mmol/L 104 100   CO2 mmol/L 32 30   ANION GAP mmol/L 4 7   BUN mg/dL 9 10   CREATININE mg/dL 1.02 1.04   EGFR ml/min/1.73sq m 94 91   CALCIUM mg/dL 8.4 9.1   MAGNESIUM mg/dL 2.0  --              Results from last 7 days   Lab Units 09/03/24  0717 09/02/24  1800   GLUCOSE RANDOM mg/dL 94 85     Results from last 7 days   Lab Units 09/02/24  1800   PROTIME seconds 13.3   INR  0.98         Past Medical History:   Diagnosis Date    Hypertension      Present on Admission:   Stroke-like symptoms      Admitting Diagnosis: Rectal bleeding  Age/Sex: 38 y.o. male  Admission Orders:  Scheduled Medications:  buprenorphine-naloxone, 8 mg, Sublingual, Daily  nicotine, 1 patch, Transdermal, Daily  pantoprazole, 40 mg, Intravenous, Q12H    Continuous IV Infusions:  multi-electrolyte, 100 mL/hr, Intravenous, Continuous    PRN Meds:  ALPRAZolam (XANAX) tablet 0.25 mg  Dose: 0.25 mg  Freq: Once Route: PO  Start: 09/03/24 0000 End: 09/03/24 0107  polyethylene glycol (GOLYTELY) bowel prep 4,000 mL  Dose: 4,000 mL  Freq: Once Route: PO  Start: 09/03/24 0100 End: 09/03/24 0251  potassium chloride (Klor-Con M20) CR tablet 40 mEq  Dose: 40 mEq  Freq: Once Route: PO  Start: 09/03/24 0045 End: 09/03/24 0057    IP CONSULT TO GASTROENTEROLOGY  IP CONSULT TO PSYCHIATRY    Network Utilization Review Department  ATTENTION: Please call with any questions or concerns to [phone] and carefully listen to the prompts so that you are directed to the right person. All voicemails are confidential.   For Discharge needs, contact Care Management DC Support Team at [phone] opt. [phone]  Send all requests for admission clinical reviews, approved or denied determinations and any other requests to dedicated fax number below  belonging to the campus where the patient is receiving treatment. List of dedicated fax numbers for the Facilities:  FACILITY NAME UR FAX NUMBER   ADMISSION DENIALS (Administrative/Medical Necessity) [phone]   DISCHARGE SUPPORT TEAM (NETWORK) [phone]   PARENT CHILD HEALTH (Maternity/NICU/Pediatrics) [phone]   [facility] [phone]   [facility] [phone]   [facility] [phone]   [facility] [phone]   [facility] [phone]   [facility] [phone]   [facility] [phone]   [facility] [phone]   [facility] [phone]   [facility] [phone]   [facility] [phone]   [facility] [phone]

## 2024-09-03 NOTE — ED NOTES
Spoke with maria alejandra De León RN at [facility]     Josi Genao RN  09/02/24 3776

## 2024-09-03 NOTE — ASSESSMENT & PLAN NOTE
"Patient with PMHx of substance abuse on suboxone and schizophrenia presented to the  ED from [facility] secondary to new BRBPR with clots. Two witnessed large episodes in the  ED of bright red blood with clots. Pt reports he was symptomatic with both episodes feeling lightheaded/dizzy, SOB, and palpitations at the time.  Pt does endorse sticking a \"stick\" up his rectum secondary to ongoing constipation for 11-12 days. He reports he removed this without issue. He then reports trying to use his finger for constipation and to try to \"dig out\" contrast from CT scan performed last week. He denies any other foreign bodies or trauma, but does appear to be poor historian.    ED discussed case with GI who recommended transfer to [facility] due to oncall availability if needed and colonoscopy in AM  CT high volume bleed pending  Vitals stable. Hemoglobin 11.7, stable  Continue to trend H&H  Transfuse prn  NPO at midnight, prep initiated  IV fluids, supportive care  GI consult pending  "

## 2024-09-03 NOTE — H&P
"[facility]  H&P  Name: Jerome Grady 38 y.o. male I MRN: [id number]  Unit/Bed#: E2 -01 I Date of Admission: 9/2/2024   Date of Service: 9/3/2024 I Hospital Day: 1      Assessment & Plan   * Hematochezia  Assessment & Plan  Patient with PMHx of substance abuse on suboxone and schizophrenia presented to the  ED from [facility] secondary to new BRBPR with clots. Two witnessed large episodes in the  ED of bright red blood with clots. Pt reports he was symptomatic with both episodes feeling lightheaded/dizzy, SOB, and palpitations at the time.  Pt does endorse sticking a \"stick\" up his rectum secondary to ongoing constipation for 11-12 days. He reports he removed this without issue. He then reports trying to use his finger for constipation and to try to \"dig out\" contrast from CT scan performed last week. He denies any other foreign bodies or trauma, but does appear to be poor historian.    ED discussed case with GI who recommended transfer to [facility] due to oncall availability if needed and colonoscopy in AM  CT high volume bleed pending  Vitals stable. Hemoglobin 11.7, stable  Continue to trend H&H  Transfuse prn  NPO at midnight, prep initiated  IV fluids, supportive care  GI consult pending    Schizophrenia ([facility])  Assessment & Plan  Pt self reported history of schizophrenia and bipolar disorder during ED 8/2019. He reports at that time recently moving from [place] and no longer taking his antipsychotics x one month  Pt currently with paranoid thoughts and described visual hallucinations. He reports his stools contained \"babies\" and \"animals\"  Does not appear to currently be maintained on medications  Psych consult    Substance abuse ([facility])  Assessment & Plan  On suboxone at [facility]  PDMP reviewed  Continue    Stroke-like symptoms  Assessment & Plan  Pt observed to have left-sided facial droop and left arm weakness with  strength following administration of Narcan after being " "found unconscious in his cell at  on 8/23/2024.   He presented to the  ED as a stroke alert. NIHSS 4. However, left AMA following CT scans.   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 He denies any other neurologic deficits, none present on exam.  CT head demonstrating \"No acute intracranial abnormality. Old right lamina papyracea fracture.\"  CTA head and neck \"No large vessel occlusion, high-grade stenosis, or intracranial aneurysm identified on CT angiogram of the head. No hemodynamically significant stenosis or dissection identified on CT angiogram of the neck.\"  Continue monitoring with neuro checks for now given patient appears to be poor historian           VTE Pharmacologic Prophylaxis: VTE Score: 0 Low Risk (Score 0-2) - Encourage Ambulation.  Code Status: Level 1 - Full Code   Discussion with family: Patient declined call to .     Anticipated Length of Stay: Patient will be admitted on an inpatient basis with an anticipated length of stay of greater than 2 midnights secondary to hematochezia.    Total Time Spent on Date of Encounter in care of patient: 65 mins. This time was spent on one or more of the following: performing physical exam; counseling and coordination of care; obtaining or reviewing history; documenting in the medical record; reviewing/ordering tests, medications or procedures; communicating with other healthcare professionals and discussing with patient's family/caregivers.    Chief Complaint: \"I have been bleeding so much\"    History of Present Illness:  Jerome Grady is a 38 y.o. male who presents with hematochezia. Pt states that for the last 11-12 days, he has not had a bowel movement. He states that he had an episode at the half-way last week when he was \"poisoned\" and this has been ongoing since that presentation. At the time patient presented to the ED, 8/23, he was found " "unresponsive and responded to narcan. On arrival, he was found to have a left facial droop and stroke alert was called. Following CT scan of his head, patient left AMA. Pt still has left facial droop but reports this is a chronic issue for him secondary to known jaw deformity. Pt appears preoccupied and paranoid about IV contrast he received last week. He reported in the ED, he was trying to \"dig out\" the contrast they put in him. Pt reports he thinks the contrast is what is causing his GI bleeding. Pt does admit to using a \"stick\" to try to relieve his constipation. He reports instant relief with bowel movement after sticking a stick up his rectum. He reports he removed the entire stick. He also reports he has been using his finger to try to relief his constipation as well. Unclear timeline as to when the rectal bleeding started in relation to use of his stick and fingers to relief constipation as patient does not appear to be reliable historian. Pt reporting his bowel movements have contained \"babies\" and \"animals.\" Pt does deny any other foreign objects in rectum or other trauma. Two episodes of large bright red stools with clots were witnessed in the  ED. Pt reports he became symptomatic with those episodes and felt lightheaded/dizzy, SOB, and palpitations following.  ED discussed with [facility] who recommended transfer secondary to oncall GI availability and colonoscopy in AM.     Review of Systems:  Review of Systems   Constitutional:  Negative for appetite change, chills, diaphoresis, fatigue and fever.   HENT:  Negative for congestion, rhinorrhea and sore throat.    Eyes:  Negative for photophobia and visual disturbance.   Respiratory:  Positive for shortness of breath. Negative for cough and wheezing.    Cardiovascular:  Positive for palpitations. Negative for chest pain and leg swelling.   Gastrointestinal:  Positive for blood in stool. Negative for abdominal distention, abdominal pain, constipation, " "diarrhea, nausea and vomiting.   Genitourinary:  Negative for dysuria and hematuria.   Musculoskeletal:  Negative for arthralgias, back pain, myalgias and neck stiffness.   Skin:  Negative for color change and rash.   Neurological:  Positive for light-headedness. Negative for dizziness, seizures, syncope, weakness and headaches.   Psychiatric/Behavioral:  Positive for hallucinations (patient reports stools had \"babies\" and \"animals\"). Negative for agitation, behavioral problems, confusion, self-injury and suicidal ideas. The patient is not nervous/anxious.    All other systems reviewed and are negative.      Past Medical and Surgical History:   Past Medical History:   Diagnosis Date    Hypertension        History reviewed. No pertinent surgical history.    Meds/Allergies:  Prior to Admission medications    Medication Sig Start Date End Date Taking? Authorizing Provider   buprenorphine-naloxone (SUBOXONE) 8-2 mg per SL tablet Place 2 tablets under the tongue daily    Historical Provider, MD MCNAMARA have reviewed home medications with patient personally.    Allergies: No Known Allergies    Social History:  Marital Status: Single   Patient Pre-hospital Living Situation: [facility]  Patient Pre-hospital Level of Mobility: walks  Patient Pre-hospital Diet Restrictions: none  Substance Use History:   Social History     Substance and Sexual Activity   Alcohol Use Not Currently     Social History     Tobacco Use   Smoking Status Every Day    Current packs/day: 0.25    Types: Cigarettes   Smokeless Tobacco Never     Social History     Substance and Sexual Activity   Drug Use Not Currently    Types: Fentanyl, Cocaine, \"Crack\" cocaine    Comment: August 4th last used - pt has been incarcerated       Family History:  History reviewed. No pertinent family history.    Physical Exam:     Vitals:   Blood Pressure: 116/83 (09/02/24 2356)  Pulse: 67 (09/02/24 2356)  Temperature: 97.5 °F (36.4 °C) (09/02/24 2356)  Temp Source: Temporal " "(09/02/24 2356)  Respirations: 17 (09/02/24 2356)  Height: 5' 5\" (165.1 cm) (09/02/24 2356)  Weight - Scale: 67.8 kg (149 lb 7.6 oz) (09/02/24 2356)  SpO2: 96 % (09/02/24 2315)    Physical Exam  Vitals and nursing note reviewed.   Constitutional:       General: He is not in acute distress.     Appearance: He is well-developed.   HENT:      Head: Normocephalic and atraumatic.      Nose: Nose normal. No congestion.      Mouth/Throat:      Mouth: Mucous membranes are dry.      Pharynx: Oropharynx is clear.   Eyes:      Conjunctiva/sclera: Conjunctivae normal.   Cardiovascular:      Rate and Rhythm: Normal rate and regular rhythm.      Heart sounds: Normal heart sounds. No murmur heard.     No friction rub. No gallop.   Pulmonary:      Effort: Pulmonary effort is normal. No respiratory distress.      Breath sounds: Normal breath sounds. No wheezing, rhonchi or rales.   Abdominal:      General: Bowel sounds are normal. There is no distension.      Palpations: Abdomen is soft.      Tenderness: There is no abdominal tenderness.   Musculoskeletal:      Cervical back: Neck supple.      Right lower leg: No edema.      Left lower leg: No edema.   Skin:     General: Skin is warm and dry.      Capillary Refill: Capillary refill takes less than 2 seconds.      Coloration: Skin is pale.   Neurological:      General: No focal deficit present.      Mental Status: He is alert and oriented to person, place, and time. Mental status is at baseline.   Psychiatric:         Mood and Affect: Mood normal.         Behavior: Behavior normal.          Additional Data:     Lab Results:  Results from last 7 days   Lab Units 09/02/24  1800   WBC Thousand/uL 5.18   HEMOGLOBIN g/dL 11.7*   HEMATOCRIT % 34.7*   PLATELETS Thousands/uL 306   SEGS PCT % 47   LYMPHO PCT % 39   MONO PCT % 11   EOS PCT % 2     Results from last 7 days   Lab Units 09/02/24  1800   SODIUM mmol/L 137   POTASSIUM mmol/L 3.6   CHLORIDE mmol/L 100   CO2 mmol/L 30   BUN mg/dL 10 " "  CREATININE mg/dL 1.04   ANION GAP mmol/L 7   CALCIUM mg/dL 9.1   GLUCOSE RANDOM mg/dL 85     Results from last 7 days   Lab Units 09/02/24  1800   INR  0.98         No results found for: \"HGBA1C\"        Lines/Drains:  Invasive Devices       Peripheral Intravenous Line  Duration             Peripheral IV 09/02/24 Distal;Left;Upper;Ventral (anterior) Arm <1 day                        Imaging: Reviewed radiology reports from this admission including: CT head  CT high volume bleeding scan abdomen pelvis    (Results Pending)       EKG and Other Studies Reviewed on Admission:   EKG: No EKG obtained.    ** Please Note: This note has been constructed using a voice recognition system. **    "

## 2024-09-03 NOTE — ED NOTES
Attempted to call report twice but number was not going through     Josi Genao, CARLOS  09/02/24 [id number]
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
## 2024-09-03 NOTE — TREATMENT PLAN
Psych evaluation in progress. They cannot assess for competency to make fully informed medical decision. They could only assess for schizophrenia.

## 2024-09-03 NOTE — TELEPHONE ENCOUNTER
Attempted to contact RN x2 with no success. Message has been sent to charge RN to get ipad set up so consult can be placed on [facility] queue.

## 2024-09-03 NOTE — PLAN OF CARE
Problem: GASTROINTESTINAL - ADULT  Goal: Maintains or returns to baseline bowel function  Description: INTERVENTIONS:  - Assess bowel function  - Encourage oral fluids to ensure adequate hydration  - Administer IV fluids if ordered to ensure adequate hydration  - Administer ordered medications as needed  - Encourage mobilization and activity  - Consider nutritional services referral to assist patient with adequate nutrition and appropriate food choices  Outcome: Progressing     Problem: HEMATOLOGIC - ADULT  Goal: Maintains hematologic stability  Description: INTERVENTIONS  - Assess for signs and symptoms of bleeding or hemorrhage  - Monitor labs  - Administer supportive blood products/factors as ordered and appropriate  Outcome: Progressing     Problem: PAIN - ADULT  Goal: Verbalizes/displays adequate comfort level or baseline comfort level  Description: Interventions:  - Encourage patient to monitor pain and request assistance  - Assess pain using appropriate pain scale  - Administer analgesics based on type and severity of pain and evaluate response  - Implement non-pharmacological measures as appropriate and evaluate response  - Consider cultural and social influences on pain and pain management  - Notify physician/advanced practitioner if interventions unsuccessful or patient reports new pain  Outcome: Progressing     Problem: SAFETY ADULT  Goal: Patient will remain free of falls  Description: INTERVENTIONS:  - Educate patient/family on patient safety including physical limitations  - Instruct patient to call for assistance with activity   - Consult OT/PT to assist with strengthening/mobility   - Keep Call bell within reach  - Keep bed low and locked with side rails adjusted as appropriate  - Keep care items and personal belongings within reach  - Initiate and maintain comfort rounds  - Make Fall Risk Sign visible to staff  - Offer Toileting every 2 Hours, in advance of need  - Initiate/Maintain bed alarm (not  on, pt cuffed to bed and  present)  - Obtain necessary fall risk management equipment  - Apply yellow socks and bracelet for high fall risk patients  - Consider moving patient to room near nurses station  Outcome: Progressing     Problem: COPING  Goal: Pt/Family able to verbalize concerns and demonstrate effective coping strategies  Description: INTERVENTIONS:  - Assist patient/family to identify coping skills, available support systems and cultural and spiritual values  - Provide emotional support, including active listening and acknowledgement of concerns of patient and caregivers  - Reduce environmental stimuli, as able  - Provide patient education  - Assess for spiritual pain/suffering and initiate spiritual care, including notification of Pastoral Care or [facility] based community as needed  - Assess effectiveness of coping strategies  Outcome: Progressing
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
## 2024-09-03 NOTE — QUICK NOTE
Gastroenterology Quick Note:    Patient with hallucinations/paranoid delusions at time of presentation. Although he is able to explain procedure, and reports understanding risks and benefits, there is concern for his true understanding given untreated underlying psychiatric disorder. Request for psychology evaluation. Given underlying mental illness, recommend neuropsych evaluation for competency to make medical decisions.    From GI perspective, patient's rectal bleeding has resolved. His stool remains liquid opaque brown. Had significant constipation prior to presentation and noted on CT scan at time of presentation. Medically, would be best for patient to complete additional prep to optimize endoscopic evaluation. Recommend continuing CLD today, Miralax/dulcolax bowel prep this evening, NPO at midnight (but can continue bowel prep until 8am). Will attempt colonoscopy tomorrow pending neuropsych evaluation    Please reach out to oncall provider with additional questions/concerns.    Jessica Albrecht DO  Gastroenterology Fellow  [facility]  Division of Gastroenterology and Hepatology  Available on [employer]Tour

## 2024-09-03 NOTE — PROGRESS NOTES
Spoke to [facility] today, they canceled the procedure. They would like to try again tomorrow. Patient competency to make medical informed decisions are currently in question from their standpoint. I personally informed the patient myself about this concern. Neuropsych evaluation has been requested and is currently pending. Given this concern, patient is not allowed to leave medical advice until he has been cleared by neuropsychiatric.

## 2024-09-03 NOTE — TELEPHONE ENCOUNTER
Consult placed on [facility] queue at 1433 to be seen by an [facility] psychiatrist.

## 2024-09-03 NOTE — PLAN OF CARE
Problem: HEMATOLOGIC - ADULT  Goal: Maintains hematologic stability  Description: INTERVENTIONS  - Assess for signs and symptoms of bleeding or hemorrhage  - Monitor labs  - Administer supportive blood products/factors as ordered and appropriate  Outcome: Progressing     Problem: GASTROINTESTINAL - ADULT  Goal: Maintains or returns to baseline bowel function  Description: INTERVENTIONS:  - Assess bowel function  - Encourage oral fluids to ensure adequate hydration  - Administer IV fluids if ordered to ensure adequate hydration  - Administer ordered medications as needed  - Encourage mobilization and activity  - Consider nutritional services referral to assist patient with adequate nutrition and appropriate food choices  Outcome: Progressing     Problem: SAFETY ADULT  Goal: Patient will remain free of falls  Description: INTERVENTIONS:  - Educate patient/family on patient safety including physical limitations  - Instruct patient to call for assistance with activity   - Consult OT/PT to assist with strengthening/mobility   - Keep Call bell within reach  - Keep bed low and locked with side rails adjusted as appropriate  - Keep care items and personal belongings within reach  - Initiate and maintain comfort rounds  - Make Fall Risk Sign visible to staff  - Offer Toileting every 2 Hours, in advance of need  - Initiate/Maintain bed alarm (not on, pt cuffed to bed and  present)  - Obtain necessary fall risk management equipment  - Apply yellow socks and bracelet for high fall risk patients  - Consider moving patient to room near nurses station  Outcome: Progressing

## 2024-09-04 VITALS
DIASTOLIC BLOOD PRESSURE: 69 MMHG | RESPIRATION RATE: 14 BRPM | BODY MASS INDEX: 22.77 KG/M2 | SYSTOLIC BLOOD PRESSURE: 119 MMHG | OXYGEN SATURATION: 99 % | TEMPERATURE: 98.9 F | WEIGHT: 136.69 LBS | HEIGHT: 65 IN | HEART RATE: 73 BPM

## 2024-09-04 PROCEDURE — 99239 HOSP IP/OBS DSCHRG MGMT >30: CPT | Performed by: STUDENT IN AN ORGANIZED HEALTH CARE EDUCATION/TRAINING PROGRAM

## 2024-09-04 PROCEDURE — 99232 SBSQ HOSP IP/OBS MODERATE 35: CPT | Performed by: INTERNAL MEDICINE

## 2024-09-04 RX ADMIN — BUPRENORPHINE AND NALOXONE 8 MG: 8; 2 FILM BUCCAL; SUBLINGUAL at 09:55

## 2024-09-04 NOTE — ASSESSMENT & PLAN NOTE
"Pt observed to have left-sided facial droop and left arm weakness with  strength following administration of Narcan after being found unconscious in his cell at  on 8/23/2024.   He presented to the  ED as a stroke alert. NIHSS 4. However, left AMA following CT scans.   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 He denies any other neurologic deficits, none present on exam.  CT head demonstrating \"No acute intracranial abnormality. Old right lamina papyracea fracture.\"  CTA head and neck \"No large vessel occlusion, high-grade stenosis, or intracranial aneurysm identified on CT angiogram of the head. No hemodynamically significant stenosis or dissection identified on CT angiogram of the neck.\"  "

## 2024-09-04 NOTE — PROGRESS NOTES
AVS printed and given to  at the bedside. IV removed. All questions answered. Officer called for transport.

## 2024-09-04 NOTE — UTILIZATION REVIEW
NOTIFICATION OF INPATIENT ADMISSION   AUTHORIZATION REQUEST   SERVICING FACILITY:   [place] [address]  Tax ID: [id number]  NPI: [id number] ATTENDING PROVIDER:  Attending Name and NPI#: Garrison Mackey Md [[id number]]  Address: [address] [place] [address]  Phone: [phone]     ADMISSION INFORMATION:  Place of Service: Inpatient [facility]  Place of Service Code: 21  Inpatient Admission Date/Time: 9/2/24 11:21 PM  Discharge Date/Time: No discharge date for patient encounter.  Admitting Diagnosis Code/Description:  Rectal bleeding     UTILIZATION REVIEW CONTACT:  Nessa Dave, Utilization   Network Utilization Review Department  Phone: [phone]  Fax [phone]  Email: [email or web address]  Contact for approvals/pending authorizations, clinical reviews, and discharge.     PHYSICIAN ADVISORY SERVICES:  Medical Necessity Denial & Mifx-il-Mdma Review  Phone: [phone]  Fax: [phone]  Email: [email or web address]     DISCHARGE SUPPORT TEAM:  For Patients Discharge Needs & Updates  Phone: [phone] opt. 2 Fax: [phone]  Email: [email or web address]

## 2024-09-04 NOTE — PROGRESS NOTES
"    [facility] - Inpatient Progress Note    PATIENT INFORMATION      Jerome Grady 38 y.o. male MRN: [id number]  Unit/Bed#: E2 -01 Encounter: [id number]    ASSESSMENT & PLAN   Jerome Grady is a 38 y.o. male with PMH significant for hypertension, schizophrenia not on medication, opioid use disorder on Suboxone, and recent admission for suspected overdose when patient was found unresponsive and responded to Narcan who presented to [facility] on 9/2  for concern of rectal bleeding with clots. Transferred to [facility] for higher level of care with recommendation for bowel prep and colonoscopy today. GI consulted for further evaluation and management     Hematochezia  Constipation  Opioid Use Disorder on Suboxone  Patient reports 1 day of BRBPR with clots leading to presentation. This is after 10 days of constipation requiring disimpaction by patient (utilized finger and \"stick\"). Episodes witnessed in ER and GI consulted who recommended transfer to [facility] for higher level of care with bowel prep and colonoscopy today. Patient without prior colonoscopy. Hgb 11.7 on presentation and downtrended to 9 on AM labs, prior baseline around 12. CT on presentation without contrast reviewed personally without acute finding, however, with moderate stool burden. Fortunately, hematochezia has cleared with prep.  Unclear cause of hematochezia - possible ischemic in setting of recent overdose vs. Stercoral ulcer with recent constipation vs iatrogenic given recent disimpaction vs.Diverticular vs. Possibly outlet bleeding, although with drop in hgb less likely vs. Less likely Malignancy  Patient completed majority of bowel prep, still with brown liquid stool.  Competency questioned given odd paranoid statements as well as potential hallucinations given his report of body/limbs coming out of his rectum leading to neuropsych/psych consult  Deemed competent to make medical " decisions   After neuropsych eval patient refused colonoscopy - sited many paranoid delusions leading to his refusal of procedure and placing IV for access/medications  Offered unsedated colonoscopy which he considered, but again refused after being told he would still need IV access in case of complication/issue arise during case  Patient ultimately decided against procedure and decided to leave AMA - fortunately overt bleeding resolved and hgb was relatively stable. Agree with AMA discharge given no workup done to evaluate initial presentation and drop. Signed for 2 physician witness of AMA given patient's refusal to sign.    At this time, GI will sign off. Please reach out with changes in above. Offered patient outpatient follow up with GI team.      SUBJECTIVE     Patient seen and evaluated at bedside. Refusing procedures after neuropsych eval. Deemed competent to make medical decisions.    Patient reports he would want unsedated procedure, which we are agreeable to try. However, he absolutely refuses IV access which is needed to pursue procedure in event bleeding or complication arises. He was adamant about no IV, and hesitant to even consider unsedated colonoscopy. Ultimately, patient refused interventions offered by team with myself, primary provider Dr. Mackey, and CO at bedside.     Was refusing to sign AMA forms - and 2 physician witnessed AMA form signed by Dr. Mackey and myself    MEDICATIONS & ALLERGIES       Medications:     Medications Prior to Admission:     buprenorphine-naloxone (SUBOXONE) 8-2 mg per SL tablet  Current Facility-Administered Medications   Medication Dose Route Frequency    buprenorphine-naloxone (Suboxone) film 8 mg  8 mg Sublingual Daily    LORazepam (ATIVAN) injection 1 mg  1 mg Intramuscular Once PRN    multi-electrolyte (PLASMALYTE-A/ISOLYTE-S PH 7.4) IV solution  100 mL/hr Intravenous Continuous    nicotine (NICODERM CQ) 7 mg/24hr TD 24 hr patch 1 patch  1 patch Transdermal  "Daily    pantoprazole (PROTONIX) injection 40 mg  40 mg Intravenous Q12H    polyethylene glycol (GLYCOLAX) bowel prep 238 g  238 g Oral Once       Allergies:   No Known Allergies    PHYSICAL EXAM     Objective   Blood pressure 119/69, pulse 73, temperature 98.9 °F (37.2 °C), temperature source Oral, resp. rate 14, height 5' 5\" (1.651 m), weight 62 kg (136 lb 11 oz), SpO2 99%. Body mass index is 22.75 kg/m².  No intake or output data in the 24 hours ending 09/04/24 1447    General Appearance:   Alert, cooperative, no distress   HEENT:   Normocephalic, atraumatic, anicteric     Neck:   Supple, symmetrical, trachea midline   Lungs:   Equal chest rise, respirations unlabored    Heart:   Regular rate and rhythm   Abdomen:   Soft, non-tender, non-distended; normal bowel sounds; no masses, no organomegaly    Rectal:   Deferred    Extremities:   No cyanosis, clubbing or edema    Neuro:   Moves all 4 extremities    Skin:   No jaundice, rashes, or lesions      ADDITIONAL DATA     Lab Results:     Results from last 7 days   Lab Units 09/03/24  1409 09/03/24  0717   WBC Thousand/uL  --  4.92   HEMOGLOBIN g/dL 8.4* 9.0*   HEMATOCRIT %  --  26.9*   PLATELETS Thousands/uL  --  258   SEGS PCT %  --  53   LYMPHO PCT %  --  36   MONO PCT %  --  9   EOS PCT %  --  2     Results from last 7 days   Lab Units 09/03/24  0717   POTASSIUM mmol/L 3.9   CHLORIDE mmol/L 104   CO2 mmol/L 32   BUN mg/dL 9   CREATININE mg/dL 1.02   CALCIUM mg/dL 8.4     Results from last 7 days   Lab Units 09/02/24  1800   INR  0.98       Imaging:    CT abdomen pelvis wo contrast    Result Date: 9/3/2024  Narrative: CT ABDOMEN AND PELVIS WITHOUT IV CONTRAST INDICATION: GI bleed, rule out foreign body. COMPARISON: None. TECHNIQUE: CT examination of the abdomen and pelvis was performed without intravenous contrast. Multiplanar 2D reformatted images were created from the source data. This examination, like all CT scans performed in the [facility], " was performed utilizing techniques to minimize radiation dose exposure, including the use of iterative reconstruction and automated exposure control. Radiation dose length product (DLP) for this visit: 408 mGy-cm Enteric Contrast: Not administered. FINDINGS: ABDOMEN LOWER CHEST: No clinically significant abnormality in the visualized lower chest. LIVER/BILIARY TREE: Unremarkable. GALLBLADDER: No calcified gallstones. No pericholecystic inflammatory change. SPLEEN: Unremarkable. PANCREAS: Unremarkable. ADRENAL GLANDS: Unremarkable. KIDNEYS/URETERS: Unremarkable. No hydronephrosis. STOMACH AND BOWEL: Unremarkable. APPENDIX: No findings to suggest appendicitis. ABDOMINOPELVIC CAVITY: No ascites. No pneumoperitoneum. No lymphadenopathy. VESSELS: Unremarkable for patient's age. PELVIS REPRODUCTIVE ORGANS: Unremarkable for patient's age. URINARY BLADDER: Unremarkable. ABDOMINAL WALL/INGUINAL REGIONS: Small fat-containing umbilical hernia. BONES: No acute fracture or suspicious osseous lesion.     Impression: No acute findings in the abdomen or pelvis within the limits of unenhanced technique. No evidence of foreign body. Workstation performed: [id number]     CTA stroke alert (head/neck)    Result Date: 8/23/2024  Narrative: CTA NECK AND BRAIN WITH CONTRAST INDICATION: Stroke Alert COMPARISON:   Noncontrast stroke alert head CT on 8/23/2024 TECHNIQUE:  Post contrast imaging was performed after administration of iodinated contrast through the neck and brain. Post contrast axial 0.625 mm images timed to opacify the arterial system.  3D rendering was performed on an independent workstation.   MIP reconstructions performed. Radiation dose length product (DLP) for this visit:  820 mGy-cm .  This examination, like all CT scans performed in the [facility], was performed utilizing techniques to minimize radiation dose exposure, including the use of iterative reconstruction and automated exposure control. 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 Visualized arch and great vessels are normal. VERTEBRAL ARTERIES: Widely patent, without stenosis or dissection. Left vertebral artery appears slightly dominant. RIGHT CAROTID: No atherosclerotic plaque identified. No stenosis.    No dissection. LEFT CAROTID: No atherosclerotic plaque identified. No stenosis.    No dissection. NASCET criteria was used to determine the degree of internal carotid artery diameter stenosis. CTA BRAIN: INTERNAL CAROTID ARTERIES: No stenosis or occlusion. ANTERIOR CEREBRAL ARTERY CIRCULATION:  No stenosis or occlusion. MIDDLE CEREBRAL ARTERY CIRCULATION:  No stenosis or occlusion. DISTAL VERTEBRAL ARTERIES:  No stenosis or occlusion. BASILAR ARTERY:  No stenosis or occlusion. POSTERIOR CEREBRAL ARTERIES: No stenosis or occlusion. VENOUS STRUCTURES:  Normal. NON VASCULAR ANATOMY BONY STRUCTURES: Old right lamina papyracea fracture. SOFT TISSUES OF THE NECK: Mild nuchal thickening of the bilateral maxillary sinuses and left greater than right ethmoid air cells. THORACIC INLET: Visualized lung apices are clear. There is an enlarged right hilar lymph node seen on series 3 image 598, incompletely included in the field-of-view and incomplete evaluated on this examination measuring up to 2.3 cm x 1.9 cm in maximal dimensions.     Impression: No large vessel occlusion, high-grade stenosis, or intracranial aneurysm identified on CT angiogram of the head. No hemodynamically significant stenosis or dissection identified on CT angiogram of the neck. Indeterminate enlarged right hilar lymph node measuring up to 2.3 cm in length dimensions. Recommend further evaluation with CT of the chest with contrast on a nonemergent basis. Findings were directly discussed with Dr. Willi Mercedes At 1:02  p.m. on 8/23/2024. Workstation performed: [id number]     CT stroke alert brain    Result Date: 8/23/2024  Narrative: CT BRAIN - STROKE ALERT PROTOCOL INDICATION:   Stroke Alert. COMPARISON:  None. TECHNIQUE:  CT examination of the brain was performed.  In addition to axial images, coronal reformatted images were created and submitted for interpretation. Radiation dose length product (DLP) for this visit:  841 mGy-cm .  This examination, like all CT scans performed in the [facility], was performed utilizing techniques to minimize radiation dose exposure, including the use of iterative reconstruction and automated exposure control. IMAGE QUALITY:  Diagnostic. FINDINGS: PARENCHYMA:  No intracranial mass, mass effect or midline shift. No CT signs of acute infarction.  No acute parenchymal hemorrhage. Normal intracranial vasculature. VENTRICLES AND EXTRA-AXIAL SPACES:  Normal for the patient's age. VISUALIZED ORBITS: There is an old right lamina appreciated fracture. The orbits otherwise appear grossly normal. PARANASAL SINUSES: Old right lamina papyracea fracture, effacing the right ethmoid air cells. Mild mucosal thickening of the bilateral maxillary sinuses and left greater than right ethmoid air cells. Mastoid air cells are clear. CALVARIUM AND EXTRACRANIAL SOFT TISSUES:   Normal.     Impression: No acute intracranial abnormality. Old right lamina papyracea fracture. Findings were directly discussed with Dr Willi Mercedes at approximately 12:58 p.m. on 8/23/2024.. Workstation performed: [id number]       EKG, Pathology, and Other Studies Reviewed on Admission:   EKG: Reviewed      Counseling / Coordination of Care Time: 30 total mins spent n consult. Greater than 50% of total time spent on patient counseling and coordination of care.    Jessica Albrecht DO  Gastroenterology Fellow  [facility]  Division of Gastroenterology and Hepatology  Available on  Tiger[employer]  ...............................................................................................................................................  ** Please Note: This note is constructed using a voice recognition dictation system. **

## 2024-09-04 NOTE — ASSESSMENT & PLAN NOTE
"Pt self reported history of schizophrenia and bipolar disorder during ED 8/2019. He reports at that time recently moving from [place] and no longer taking his antipsychotics x one month  Pt currently with paranoid thoughts and described visual hallucinations. He reports his stools contained \"babies\" and \"animals\"  Psychiatry recommendations appreciated. Requires further evaluation outpatient prior to medication initiation.   "

## 2024-09-04 NOTE — ASSESSMENT & PLAN NOTE
"Background  Patient with PMHx of substance abuse on suboxone and schizophrenia presented to the  ED from [facility] secondary to new BRBPR with clots. Two witnessed large episodes in the  ED of bright red blood with clots. Pt reports he was symptomatic with both episodes feeling lightheaded/dizzy, SOB, and palpitations at the time.  Pt does endorse sticking a \"stick\" up his rectum secondary to ongoing constipation for 11-12 days. He reports he removed this without issue. He then reports trying to use his finger for constipation and to try to \"dig out\" contrast from CT scan performed last week. He denies any other foreign bodies or trauma, but does appear to be poor historian.    ED discussed case with GI who recommended transfer to [facility] due to oncall availability if needed and colonoscopy in AM    Plan:  CT Abdomen pelvis Showing no acute findings in the abdomen or pelvis within the limits of unenhanced technique. No evidence of foreign body.   GI recommendations appreciated.  Deemed competent by neuropsych to make medical decisions.  Procedure was planned for this afternoon, unfortunately patient stated that he is no longer interested.  He wants to leave AGAINST MEDICAL ADVICE, but did not wish to sign the form.  Form was cosigned by Dr. Albrecht and I as a result of this.   is aware of this.  I informed their medical facility about his decision to leave AGAINST MEDICAL ADVICE ([facility]ala and Officer Edward).     "

## 2024-09-04 NOTE — PLAN OF CARE
Problem: GASTROINTESTINAL - ADULT  Goal: Maintains or returns to baseline bowel function  Description: INTERVENTIONS:  - Assess bowel function  - Encourage oral fluids to ensure adequate hydration  - Administer IV fluids if ordered to ensure adequate hydration  - Administer ordered medications as needed  - Encourage mobilization and activity  - Consider nutritional services referral to assist patient with adequate nutrition and appropriate food choices  Outcome: Completed     Problem: HEMATOLOGIC - ADULT  Goal: Maintains hematologic stability  Description: INTERVENTIONS  - Assess for signs and symptoms of bleeding or hemorrhage  - Monitor labs  - Administer supportive blood products/factors as ordered and appropriate  Outcome: Completed     Problem: PAIN - ADULT  Goal: Verbalizes/displays adequate comfort level or baseline comfort level  Description: Interventions:  - Encourage patient to monitor pain and request assistance  - Assess pain using appropriate pain scale  - Administer analgesics based on type and severity of pain and evaluate response  - Implement non-pharmacological measures as appropriate and evaluate response  - Consider cultural and social influences on pain and pain management  - Notify physician/advanced practitioner if interventions unsuccessful or patient reports new pain  Outcome: Completed     Problem: SAFETY ADULT  Goal: Patient will remain free of falls  Description: INTERVENTIONS:  - Educate patient/family on patient safety including physical limitations  - Instruct patient to call for assistance with activity   - Consult OT/PT to assist with strengthening/mobility   - Keep Call bell within reach  - Keep bed low and locked with side rails adjusted as appropriate  - Keep care items and personal belongings within reach  - Initiate and maintain comfort rounds  - Make Fall Risk Sign visible to staff  - Offer Toileting every 2 Hours, in advance of need  - Initiate/Maintain bed alarm (not on,  pt cuffed to bed and  present)  - Obtain necessary fall risk management equipment  - Apply yellow socks and bracelet for high fall risk patients  - Consider moving patient to room near nurses station  Outcome: Completed     Problem: COPING  Goal: Pt/Family able to verbalize concerns and demonstrate effective coping strategies  Description: INTERVENTIONS:  - Assist patient/family to identify coping skills, available support systems and cultural and spiritual values  - Provide emotional support, including active listening and acknowledgement of concerns of patient and caregivers  - Reduce environmental stimuli, as able  - Provide patient education  - Assess for spiritual pain/suffering and initiate spiritual care, including notification of Pastoral Care or [place] based community as needed  - Assess effectiveness of coping strategies  Outcome: Completed
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
## 2024-09-04 NOTE — CONSULTS
"Consultation - Neuropsychology/Psychology Department  Jerome Grady 38 y.o. male MRN: [id number]  Unit/Bed#: E2 -01 Encounter: [id number]        Reason for Consultation:  Jerome Grady is a 38 y.o. year old male who was referred for a Neuropsychological Exam to assess cognitive functioning and comment on capacity to make medical decisions.      History of Present Illness  Hematochezia  Physician Requesting Consult: Garrison Mackey MD    PROBLEM LIST:  Patient Active Problem List   Diagnosis    Stroke-like symptoms    Hematochezia    Substance abuse (HCC)    Schizophrenia (HCC)         Historical Information   Past Medical History:   Diagnosis Date    Hypertension      History reviewed. No pertinent surgical history.  Social History   Social History     Substance and Sexual Activity   Alcohol Use Not Currently     Social History     Substance and Sexual Activity   Drug Use Not Currently    Types: Fentanyl, Cocaine, \"Crack\" cocaine    Comment: August 4th last used - pt has been incarcerated     Social History     Tobacco Use   Smoking Status Every Day    Current packs/day: 0.25    Types: Cigarettes   Smokeless Tobacco Never     Family History: History reviewed. No pertinent family history.    Meds/Allergies   current meds:   Current Facility-Administered Medications   Medication Dose Route Frequency    buprenorphine-naloxone (Suboxone) film 8 mg  8 mg Sublingual Daily    LORazepam (ATIVAN) injection 1 mg  1 mg Intramuscular Once PRN    multi-electrolyte (PLASMALYTE-A/ISOLYTE-S PH 7.4) IV solution  100 mL/hr Intravenous Continuous    nicotine (NICODERM CQ) 7 mg/24hr TD 24 hr patch 1 patch  1 patch Transdermal Daily    pantoprazole (PROTONIX) injection 40 mg  40 mg Intravenous Q12H    polyethylene glycol (GLYCOLAX) bowel prep 238 g  238 g Oral Once       No Known Allergies      Family and Social Support:   No data recorded    Behavioral Observations/Cognitive Examination  Patient was alert, oriented " x 3 and cooperative; patient reported he was irritable and wished to leave the hospital declining further services; patient was aware of reason for hospitalization and medical/psychiatric history; there were no indications of prominent cognitive dysfunction, in fact, he performed within normal limits on the MMSE 27/28 and demonstrated adequate mental flexibility, working memory, recall, ability to follow commands and comprehension, and on a brief measure of assessing awareness of personal health status and ability to evaluate health problems, handle medical emergencies and take safety precautions, patient performed within normal limits. During this encounter, patient appears to have capacity to make informed medical decisions.

## 2024-09-05 NOTE — UTILIZATION REVIEW
NOTIFICATION OF ADMISSION DISCHARGE   This is a Notification of Discharge from [facility]. Please be advised that this patient has been discharge from our facility. Below you will find the admission and discharge date and time including the patient’s disposition.   UTILIZATION REVIEW CONTACT:  Cristian Dave  Utilization   Network Utilization Review Department  Phone: [phone] x carefully listen to the prompts. All voicemails are confidential.  Email: [email or web address]     ADMISSION INFORMATION  PRESENTATION DATE: 9/2/2024 11:21 PM  OBERVATION ADMISSION DATE: N/A  INPATIENT ADMISSION DATE: 9/2/24 11:21 PM   DISCHARGE DATE: 9/4/2024  3:10 PM   DISPOSITION:Left against medical advice or discontinued care    Network Utilization Review Department  ATTENTION: Please call with any questions or concerns to [phone] and carefully listen to the prompts so that you are directed to the right person. All voicemails are confidential.   For Discharge needs, contact Care Management DC Support Team at [phone] opt. 2  Send all requests for admission clinical reviews, approved or denied determinations and any other requests to dedicated fax number below belonging to the campus where the patient is receiving treatment. List of dedicated fax numbers for the Facilities:  FACILITY NAME UR FAX NUMBER   ADMISSION DENIALS (Administrative/Medical Necessity) [phone]   DISCHARGE SUPPORT TEAM ([facility]) [phone]   PARENT CHILD HEALTH (Maternity/NICU/Pediatrics) [phone]   [facility] [phone]   [facility] [phone]   [facility] [phone]   [facility] [phone]   [facility] [phone]   [facility] [phone]   [facility] [phone]   [facility]  [facility] [phone]   [facility] [phone]   [facility] [phone]   [facility] [phone]   [facility] [phone]

## 2024-11-24 ENCOUNTER — HOSPITAL ENCOUNTER (EMERGENCY)
Facility: HOSPITAL | Age: 39
Discharge: HOME | End: 2024-11-24
Attending: EMERGENCY MEDICINE
Payer: OTHER GOVERNMENT

## 2024-11-24 ENCOUNTER — APPOINTMENT (EMERGENCY)
Dept: RADIOLOGY | Facility: HOSPITAL | Age: 39
End: 2024-11-24
Payer: OTHER GOVERNMENT

## 2024-11-24 VITALS
RESPIRATION RATE: 20 BRPM | TEMPERATURE: 98.2 F | DIASTOLIC BLOOD PRESSURE: 97 MMHG | BODY MASS INDEX: 23.15 KG/M2 | SYSTOLIC BLOOD PRESSURE: 178 MMHG | HEART RATE: 76 BPM | WEIGHT: 139.11 LBS | OXYGEN SATURATION: 99 %

## 2024-11-24 DIAGNOSIS — M25.531 RIGHT WRIST PAIN: Primary | ICD-10-CM

## 2024-11-24 PROCEDURE — 99284 EMERGENCY DEPT VISIT MOD MDM: CPT | Performed by: EMERGENCY MEDICINE

## 2024-11-24 PROCEDURE — 99283 EMERGENCY DEPT VISIT LOW MDM: CPT

## 2024-11-24 PROCEDURE — 73110 X-RAY EXAM OF WRIST: CPT
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
